# Patient Record
Sex: FEMALE | Race: WHITE | NOT HISPANIC OR LATINO
[De-identification: names, ages, dates, MRNs, and addresses within clinical notes are randomized per-mention and may not be internally consistent; named-entity substitution may affect disease eponyms.]

---

## 2021-03-15 PROBLEM — Z00.00 ENCOUNTER FOR PREVENTIVE HEALTH EXAMINATION: Status: ACTIVE | Noted: 2021-03-15

## 2021-03-24 ENCOUNTER — NON-APPOINTMENT (OUTPATIENT)
Age: 56
End: 2021-03-24

## 2021-03-24 ENCOUNTER — APPOINTMENT (OUTPATIENT)
Dept: CARDIOLOGY | Facility: CLINIC | Age: 56
End: 2021-03-24
Payer: COMMERCIAL

## 2021-03-24 VITALS
TEMPERATURE: 98.4 F | HEIGHT: 66 IN | DIASTOLIC BLOOD PRESSURE: 70 MMHG | BODY MASS INDEX: 22.5 KG/M2 | OXYGEN SATURATION: 97 % | SYSTOLIC BLOOD PRESSURE: 120 MMHG | HEART RATE: 60 BPM | WEIGHT: 140 LBS

## 2021-03-24 DIAGNOSIS — Z78.9 OTHER SPECIFIED HEALTH STATUS: ICD-10-CM

## 2021-03-24 DIAGNOSIS — Z80.7 FAMILY HISTORY OF OTHER MALIGNANT NEOPLASMS OF LYMPHOID, HEMATOPOIETIC AND RELATED TISSUES: ICD-10-CM

## 2021-03-24 DIAGNOSIS — Z13.6 ENCOUNTER FOR SCREENING FOR CARDIOVASCULAR DISORDERS: ICD-10-CM

## 2021-03-24 DIAGNOSIS — Z82.49 FAMILY HISTORY OF ISCHEMIC HEART DISEASE AND OTHER DISEASES OF THE CIRCULATORY SYSTEM: ICD-10-CM

## 2021-03-24 PROCEDURE — 99072 ADDL SUPL MATRL&STAF TM PHE: CPT

## 2021-03-24 PROCEDURE — 93000 ELECTROCARDIOGRAM COMPLETE: CPT

## 2021-03-24 PROCEDURE — 99204 OFFICE O/P NEW MOD 45 MIN: CPT

## 2021-03-24 NOTE — HISTORY OF PRESENT ILLNESS
[FreeTextEntry1] : Breonna is a 56yo female with PMH of HTN who presents to the office today for cardiac evaluation. Patient states she has never seen a cardiologist before and has family history of heart disease. Patient states she is feeling well today and has no issues or concerns, states she does not currently have any cardiac symptoms. Denies chest pain, palpitations, or dyspnea. Does endorse rare fatigue.

## 2021-03-24 NOTE — PHYSICAL EXAM
[General Appearance - Well Developed] : well developed [Normal Appearance] : normal appearance [Well Groomed] : well groomed [General Appearance - Well Nourished] : well nourished [No Deformities] : no deformities [General Appearance - In No Acute Distress] : no acute distress [Normal Conjunctiva] : the conjunctiva exhibited no abnormalities [Eyelids - No Xanthelasma] : the eyelids demonstrated no xanthelasmas [Normal Oral Mucosa] : normal oral mucosa [No Oral Pallor] : no oral pallor [No Oral Cyanosis] : no oral cyanosis [Normal Jugular Venous A Waves Present] : normal jugular venous A waves present [Normal Jugular Venous V Waves Present] : normal jugular venous V waves present [No Jugular Venous Fermin A Waves] : no jugular venous fermin A waves [Heart Rate And Rhythm] : heart rate and rhythm were normal [Heart Sounds] : normal S1 and S2 [Murmurs] : no murmurs present [Respiration, Rhythm And Depth] : normal respiratory rhythm and effort [Exaggerated Use Of Accessory Muscles For Inspiration] : no accessory muscle use [Auscultation Breath Sounds / Voice Sounds] : lungs were clear to auscultation bilaterally [Abdomen Soft] : soft [Abdomen Tenderness] : non-tender [Abdomen Mass (___ Cm)] : no abdominal mass palpated [Abnormal Walk] : normal gait [Gait - Sufficient For Exercise Testing] : the gait was sufficient for exercise testing [Nail Clubbing] : no clubbing of the fingernails [Cyanosis, Localized] : no localized cyanosis [Petechial Hemorrhages (___cm)] : no petechial hemorrhages [Skin Color & Pigmentation] : normal skin color and pigmentation [] : no rash [No Venous Stasis] : no venous stasis [Skin Lesions] : no skin lesions [No Skin Ulcers] : no skin ulcer [No Xanthoma] : no  xanthoma was observed [Oriented To Time, Place, And Person] : oriented to person, place, and time [Affect] : the affect was normal [Mood] : the mood was normal [No Anxiety] : not feeling anxious [FreeTextEntry1] : eliud

## 2021-03-29 ENCOUNTER — TRANSCRIPTION ENCOUNTER (OUTPATIENT)
Age: 56
End: 2021-03-29

## 2021-04-08 ENCOUNTER — APPOINTMENT (OUTPATIENT)
Dept: CARDIOLOGY | Facility: CLINIC | Age: 56
End: 2021-04-08
Payer: COMMERCIAL

## 2021-04-08 ENCOUNTER — NON-APPOINTMENT (OUTPATIENT)
Age: 56
End: 2021-04-08

## 2021-04-08 PROCEDURE — 93321 DOPPLER ECHO F-UP/LMTD STD: CPT

## 2021-04-08 PROCEDURE — 93351 STRESS TTE COMPLETE: CPT

## 2021-04-08 PROCEDURE — 93325 DOPPLER ECHO COLOR FLOW MAPG: CPT

## 2021-04-27 ENCOUNTER — NON-APPOINTMENT (OUTPATIENT)
Age: 56
End: 2021-04-27

## 2021-05-25 ENCOUNTER — APPOINTMENT (OUTPATIENT)
Dept: CARDIOLOGY | Facility: CLINIC | Age: 56
End: 2021-05-25

## 2021-06-04 ENCOUNTER — APPOINTMENT (OUTPATIENT)
Dept: PULMONOLOGY | Facility: CLINIC | Age: 56
End: 2021-06-04
Payer: COMMERCIAL

## 2021-06-04 ENCOUNTER — NON-APPOINTMENT (OUTPATIENT)
Age: 56
End: 2021-06-04

## 2021-06-04 VITALS
HEIGHT: 65 IN | TEMPERATURE: 97.5 F | SYSTOLIC BLOOD PRESSURE: 140 MMHG | OXYGEN SATURATION: 98 % | HEART RATE: 59 BPM | WEIGHT: 142 LBS | BODY MASS INDEX: 23.66 KG/M2 | DIASTOLIC BLOOD PRESSURE: 80 MMHG | RESPIRATION RATE: 16 BRPM

## 2021-06-04 DIAGNOSIS — Z86.19 PERSONAL HISTORY OF OTHER INFECTIOUS AND PARASITIC DISEASES: ICD-10-CM

## 2021-06-04 DIAGNOSIS — Z86.79 PERSONAL HISTORY OF OTHER DISEASES OF THE CIRCULATORY SYSTEM: ICD-10-CM

## 2021-06-04 DIAGNOSIS — Z82.49 FAMILY HISTORY OF ISCHEMIC HEART DISEASE AND OTHER DISEASES OF THE CIRCULATORY SYSTEM: ICD-10-CM

## 2021-06-04 DIAGNOSIS — Z86.39 PERSONAL HISTORY OF OTHER ENDOCRINE, NUTRITIONAL AND METABOLIC DISEASE: ICD-10-CM

## 2021-06-04 PROCEDURE — 94010 BREATHING CAPACITY TEST: CPT

## 2021-06-04 PROCEDURE — 71046 X-RAY EXAM CHEST 2 VIEWS: CPT

## 2021-06-04 PROCEDURE — 99204 OFFICE O/P NEW MOD 45 MIN: CPT | Mod: 25

## 2021-06-04 NOTE — ADDENDUM
[FreeTextEntry1] : Documented by Katiuska Powell acting as a scribe for Dr. Shiva Fuller on 06/04/2021 \par \par All medical record entries made by the Scribe were at my, Dr. Shiva Fuller's, direction and personally dictated by me on 06/04/2021 . I have reviewed the chart and agree that the record accurately reflects my personal performance of the history, physical exam, assessment and plan. I have also personally directed, reviewed, and agree with the discharge instructions.

## 2021-06-04 NOTE — HISTORY OF PRESENT ILLNESS
[TextBox_4] : Ms. CABALLERO is a 55 year old female presenting to the office today for initial pulmonary evaluation. Her chief complaint is\par - Denies any SOB \par - she notes she likes to walk for exercise \par - no muscle aches / pains\par - mother had mold exposure in the house\par - her sleep has been good \par - she notes she is not sure if she snores\par - when she wakes up in the morning she feels rested most of the time \par - memory / concentration could be better\par - she reports being able to fall asleep as a passenger in a car for over an hour\par - she reports being able to fall asleep while watching a boring TV show \par - she notes before her daughter was born she was told she had sarcoid, she was given steroids and was told it went away. She notes this was over 22 years ago in 1990s. \par -she denies any visual issues, headaches, nausea, vomiting, fever, chills, sweats, chest pain, chest pressure, diarrhea, constipation, dysphagia, dizziness, leg swelling, leg pain, itchy eyes, itchy ears, heartburn, reflux, sour taste in the mouth, myalgias or arthralgias.\par

## 2021-06-04 NOTE — PROCEDURE
[FreeTextEntry1] :  CXR reveals  normal sized heart; there was no evidence of infiltrate or effusion -- A normal chest radiograph. \par \par CT (APR.15.2021) IMPRESSION: 1. proximal aorta upper normal to slightly dilated at 3.8 cm. 2. several 2 to 4 mm solid and groundglass nodules right lung. recommended to follow up CT chest in 12 months as per Fleischner criteria. \par \par PFT reveals normal flows, with an FEV1 of   2.36L, which is 86% of predicted, with normal flow volume loop

## 2021-06-08 LAB — ERYTHROCYTE [SEDIMENTATION RATE] IN BLOOD BY WESTERGREN METHOD: 12 MM/HR

## 2021-06-10 ENCOUNTER — TRANSCRIPTION ENCOUNTER (OUTPATIENT)
Age: 56
End: 2021-06-10

## 2021-06-10 ENCOUNTER — NON-APPOINTMENT (OUTPATIENT)
Age: 56
End: 2021-06-10

## 2021-06-10 LAB
ACE BLD-CCNC: 69 U/L
M TB IFN-G BLD-IMP: NEGATIVE
QUANTIFERON TB PLUS MITOGEN MINUS NIL: 8.73 IU/ML
QUANTIFERON TB PLUS NIL: 0.03 IU/ML
QUANTIFERON TB PLUS TB1 MINUS NIL: -0.01 IU/ML
QUANTIFERON TB PLUS TB2 MINUS NIL: -0.01 IU/ML

## 2021-06-11 LAB
ALTERN TENCAPG(M6): 3.5 MCG/ML
ASPER FUMCAPG(M3): 33.8 MCG/ML
AUREOBASCAPG(M12): 3.9 MCG/ML
MICROPOLYCAPG(M22): 3.2 MCG/ML
PENIC CHRYCAPG(M1): 14.3 MCG/ML
PHOMA BETAE IGG: 4.9 MCG/ML
THERMOCAPG(M23): 10.9 MCG/ML
TRICHODERMA VIRIDE IGG: 2.3 MCG/ML

## 2021-06-15 ENCOUNTER — APPOINTMENT (OUTPATIENT)
Dept: CARDIOLOGY | Facility: CLINIC | Age: 56
End: 2021-06-15

## 2021-06-17 ENCOUNTER — TRANSCRIPTION ENCOUNTER (OUTPATIENT)
Age: 56
End: 2021-06-17

## 2021-06-17 ENCOUNTER — NON-APPOINTMENT (OUTPATIENT)
Age: 56
End: 2021-06-17

## 2021-07-13 ENCOUNTER — APPOINTMENT (OUTPATIENT)
Dept: CARDIOLOGY | Facility: CLINIC | Age: 56
End: 2021-07-13
Payer: COMMERCIAL

## 2021-07-13 VITALS
DIASTOLIC BLOOD PRESSURE: 81 MMHG | BODY MASS INDEX: 23.32 KG/M2 | WEIGHT: 140 LBS | TEMPERATURE: 98.3 F | HEIGHT: 65 IN | HEART RATE: 65 BPM | OXYGEN SATURATION: 96 % | SYSTOLIC BLOOD PRESSURE: 140 MMHG

## 2021-07-13 DIAGNOSIS — L81.2 FRECKLES: ICD-10-CM

## 2021-07-13 DIAGNOSIS — Z12.39 ENCOUNTER FOR OTHER SCREENING FOR MALIGNANT NEOPLASM OF BREAST: ICD-10-CM

## 2021-07-13 DIAGNOSIS — Z12.11 ENCOUNTER FOR SCREENING FOR MALIGNANT NEOPLASM OF COLON: ICD-10-CM

## 2021-07-13 PROCEDURE — 99213 OFFICE O/P EST LOW 20 MIN: CPT

## 2021-07-13 NOTE — HISTORY OF PRESENT ILLNESS
[FreeTextEntry1] : pt presents for f/u pt with hcx of dyslipidemia on statin mild cad by calcium score .pt feels well pt seeing dr lyon re lung nodules .pt with remote sarcoid hx pt denies any chest  pain dizziness ,lightheadedness ,nausea vomiting diaphoresis\par

## 2021-07-13 NOTE — PHYSICAL EXAM

## 2021-07-27 ENCOUNTER — NON-APPOINTMENT (OUTPATIENT)
Age: 56
End: 2021-07-27

## 2021-08-30 DIAGNOSIS — Z01.812 ENCOUNTER FOR PREPROCEDURAL LABORATORY EXAMINATION: ICD-10-CM

## 2021-11-17 ENCOUNTER — TRANSCRIPTION ENCOUNTER (OUTPATIENT)
Age: 56
End: 2021-11-17

## 2021-11-21 ENCOUNTER — TRANSCRIPTION ENCOUNTER (OUTPATIENT)
Age: 56
End: 2021-11-21

## 2021-12-02 ENCOUNTER — NON-APPOINTMENT (OUTPATIENT)
Age: 56
End: 2021-12-02

## 2021-12-13 ENCOUNTER — RX RENEWAL (OUTPATIENT)
Age: 56
End: 2021-12-13

## 2022-01-12 ENCOUNTER — APPOINTMENT (OUTPATIENT)
Dept: CARDIOLOGY | Facility: CLINIC | Age: 57
End: 2022-01-12

## 2022-01-14 ENCOUNTER — APPOINTMENT (OUTPATIENT)
Dept: CARDIOLOGY | Facility: CLINIC | Age: 57
End: 2022-01-14
Payer: COMMERCIAL

## 2022-01-14 ENCOUNTER — NON-APPOINTMENT (OUTPATIENT)
Age: 57
End: 2022-01-14

## 2022-01-14 VITALS
DIASTOLIC BLOOD PRESSURE: 80 MMHG | OXYGEN SATURATION: 98 % | WEIGHT: 155 LBS | SYSTOLIC BLOOD PRESSURE: 142 MMHG | HEIGHT: 65 IN | TEMPERATURE: 98.3 F | HEART RATE: 63 BPM | BODY MASS INDEX: 25.83 KG/M2

## 2022-01-14 DIAGNOSIS — R53.83 OTHER FATIGUE: ICD-10-CM

## 2022-01-14 DIAGNOSIS — Z71.85 ENCOUNTER FOR IMMUNIZATION SAFETY COUNSELING: ICD-10-CM

## 2022-01-14 PROCEDURE — 93000 ELECTROCARDIOGRAM COMPLETE: CPT

## 2022-01-14 PROCEDURE — 99213 OFFICE O/P EST LOW 20 MIN: CPT

## 2022-01-14 RX ORDER — ASPIRIN ENTERIC COATED TABLETS 81 MG 81 MG/1
81 TABLET, DELAYED RELEASE ORAL
Qty: 100 | Refills: 4 | Status: DISCONTINUED | COMMUNITY
Start: 2021-04-08 | End: 2022-01-14

## 2022-01-14 NOTE — HISTORY OF PRESENT ILLNESS
[FreeTextEntry1] : This is a 56 year old female with PMHx of HTN, HLD, Dilated Aortic root, CAD, Sarcoidosis, who presents today for follow up visit. She feels well overall today with no acute complaints or concerns at today's visit. Denies chest pain, dyspnea, dizziness, palpations, changes in appetite/bowel habits, nausea, vomiting, abdominal pain.pt with rare fatigue \par

## 2022-01-25 ENCOUNTER — TRANSCRIPTION ENCOUNTER (OUTPATIENT)
Age: 57
End: 2022-01-25

## 2022-02-10 LAB
25(OH)D3 SERPL-MCNC: 21.9 NG/ML
ALBUMIN SERPL ELPH-MCNC: 4.5 G/DL
ALBUMIN SERPL ELPH-MCNC: 4.6 G/DL
ALBUMIN SERPL ELPH-MCNC: 4.8 G/DL
ALP BLD-CCNC: 71 U/L
ALP BLD-CCNC: 78 U/L
ALP BLD-CCNC: 93 U/L
ALT SERPL-CCNC: 11 U/L
ALT SERPL-CCNC: 14 U/L
ALT SERPL-CCNC: 9 U/L
ANION GAP SERPL CALC-SCNC: 11 MMOL/L
ANION GAP SERPL CALC-SCNC: 11 MMOL/L
ANION GAP SERPL CALC-SCNC: 14 MMOL/L
ANION GAP SERPL CALC-SCNC: 16 MMOL/L
APPEARANCE: CLEAR
APPEARANCE: CLEAR
AST SERPL-CCNC: 18 U/L
AST SERPL-CCNC: 22 U/L
AST SERPL-CCNC: 24 U/L
BACTERIA UR CULT: NORMAL
BACTERIA: NEGATIVE
BACTERIA: NEGATIVE
BASOPHILS # BLD AUTO: 0.02 K/UL
BASOPHILS # BLD AUTO: 0.03 K/UL
BASOPHILS # BLD AUTO: 0.03 K/UL
BASOPHILS NFR BLD AUTO: 0.4 %
BASOPHILS NFR BLD AUTO: 0.5 %
BASOPHILS NFR BLD AUTO: 0.5 %
BILIRUB DIRECT SERPL-MCNC: 0.1 MG/DL
BILIRUB DIRECT SERPL-MCNC: 0.1 MG/DL
BILIRUB INDIRECT SERPL-MCNC: 0.2 MG/DL
BILIRUB INDIRECT SERPL-MCNC: 0.4 MG/DL
BILIRUB SERPL-MCNC: 0.2 MG/DL
BILIRUB SERPL-MCNC: 0.5 MG/DL
BILIRUB SERPL-MCNC: 0.5 MG/DL
BILIRUBIN URINE: NEGATIVE
BILIRUBIN URINE: NEGATIVE
BLOOD URINE: NEGATIVE
BLOOD URINE: NEGATIVE
BUN SERPL-MCNC: 11 MG/DL
BUN SERPL-MCNC: 12 MG/DL
BUN SERPL-MCNC: 16 MG/DL
BUN SERPL-MCNC: 17 MG/DL
CALCIUM SERPL-MCNC: 10 MG/DL
CALCIUM SERPL-MCNC: 10.1 MG/DL
CALCIUM SERPL-MCNC: 10.1 MG/DL
CALCIUM SERPL-MCNC: 9.6 MG/DL
CHLORIDE SERPL-SCNC: 101 MMOL/L
CHLORIDE SERPL-SCNC: 103 MMOL/L
CHLORIDE SERPL-SCNC: 103 MMOL/L
CHLORIDE SERPL-SCNC: 105 MMOL/L
CHOLEST SERPL-MCNC: 181 MG/DL
CHOLEST SERPL-MCNC: 210 MG/DL
CHOLEST SERPL-MCNC: 297 MG/DL
CK SERPL-CCNC: 60 U/L
CK SERPL-CCNC: 72 U/L
CO2 SERPL-SCNC: 23 MMOL/L
CO2 SERPL-SCNC: 23 MMOL/L
CO2 SERPL-SCNC: 26 MMOL/L
CO2 SERPL-SCNC: 26 MMOL/L
COLOR: COLORLESS
COLOR: NORMAL
CREAT SERPL-MCNC: 0.78 MG/DL
CREAT SERPL-MCNC: 0.8 MG/DL
CREAT SERPL-MCNC: 0.81 MG/DL
CREAT SERPL-MCNC: 0.89 MG/DL
EOSINOPHIL # BLD AUTO: 0.13 K/UL
EOSINOPHIL # BLD AUTO: 0.13 K/UL
EOSINOPHIL # BLD AUTO: 0.15 K/UL
EOSINOPHIL NFR BLD AUTO: 2.2 %
EOSINOPHIL NFR BLD AUTO: 2.4 %
EOSINOPHIL NFR BLD AUTO: 2.7 %
ESTIMATED AVERAGE GLUCOSE: 117 MG/DL
ESTIMATED AVERAGE GLUCOSE: 120 MG/DL
ESTIMATED AVERAGE GLUCOSE: 131 MG/DL
GLUCOSE QUALITATIVE U: NEGATIVE
GLUCOSE QUALITATIVE U: NEGATIVE
GLUCOSE SERPL-MCNC: 110 MG/DL
GLUCOSE SERPL-MCNC: 87 MG/DL
GLUCOSE SERPL-MCNC: 87 MG/DL
GLUCOSE SERPL-MCNC: 91 MG/DL
HBA1C MFR BLD HPLC: 5.7 %
HBA1C MFR BLD HPLC: 5.8 %
HBA1C MFR BLD HPLC: 6.2 %
HCT VFR BLD CALC: 43.6 %
HCT VFR BLD CALC: 44.1 %
HCT VFR BLD CALC: 44.4 %
HDLC SERPL-MCNC: 67 MG/DL
HDLC SERPL-MCNC: 74 MG/DL
HDLC SERPL-MCNC: 78 MG/DL
HGB BLD-MCNC: 13.9 G/DL
HGB BLD-MCNC: 14.3 G/DL
HGB BLD-MCNC: 14.4 G/DL
HYALINE CASTS: 0 /LPF
HYALINE CASTS: 0 /LPF
IMM GRANULOCYTES NFR BLD AUTO: 0.2 %
IMM GRANULOCYTES NFR BLD AUTO: 0.2 %
IMM GRANULOCYTES NFR BLD AUTO: 0.4 %
KETONES URINE: NEGATIVE
KETONES URINE: NEGATIVE
LDLC SERPL CALC-MCNC: 115 MG/DL
LDLC SERPL CALC-MCNC: 207 MG/DL
LDLC SERPL CALC-MCNC: 95 MG/DL
LDLC SERPL DIRECT ASSAY-MCNC: 121 MG/DL
LDLC SERPL DIRECT ASSAY-MCNC: 96 MG/DL
LEUKOCYTE ESTERASE URINE: NEGATIVE
LEUKOCYTE ESTERASE URINE: NEGATIVE
LYMPHOCYTES # BLD AUTO: 1.8 K/UL
LYMPHOCYTES # BLD AUTO: 1.95 K/UL
LYMPHOCYTES # BLD AUTO: 2.17 K/UL
LYMPHOCYTES NFR BLD AUTO: 31.9 %
LYMPHOCYTES NFR BLD AUTO: 32.9 %
LYMPHOCYTES NFR BLD AUTO: 40.5 %
MAN DIFF?: NORMAL
MCHC RBC-ENTMCNC: 29.1 PG
MCHC RBC-ENTMCNC: 29.3 PG
MCHC RBC-ENTMCNC: 30 PG
MCHC RBC-ENTMCNC: 31.5 GM/DL
MCHC RBC-ENTMCNC: 32.2 GM/DL
MCHC RBC-ENTMCNC: 33 GM/DL
MCV RBC AUTO: 90.8 FL
MCV RBC AUTO: 91 FL
MCV RBC AUTO: 92.5 FL
MICROSCOPIC-UA: NORMAL
MICROSCOPIC-UA: NORMAL
MONOCYTES # BLD AUTO: 0.36 K/UL
MONOCYTES # BLD AUTO: 0.36 K/UL
MONOCYTES # BLD AUTO: 0.4 K/UL
MONOCYTES NFR BLD AUTO: 6.1 %
MONOCYTES NFR BLD AUTO: 6.7 %
MONOCYTES NFR BLD AUTO: 7.1 %
NEUTROPHILS # BLD AUTO: 2.67 K/UL
NEUTROPHILS # BLD AUTO: 3.25 K/UL
NEUTROPHILS # BLD AUTO: 3.45 K/UL
NEUTROPHILS NFR BLD AUTO: 49.8 %
NEUTROPHILS NFR BLD AUTO: 57.4 %
NEUTROPHILS NFR BLD AUTO: 58.1 %
NITRITE URINE: NEGATIVE
NITRITE URINE: NEGATIVE
NONHDLC SERPL-MCNC: 107 MG/DL
NONHDLC SERPL-MCNC: 132 MG/DL
NONHDLC SERPL-MCNC: 229 MG/DL
PH URINE: 6
PH URINE: 6.5
PLATELET # BLD AUTO: 192 K/UL
PLATELET # BLD AUTO: 200 K/UL
PLATELET # BLD AUTO: 218 K/UL
POTASSIUM SERPL-SCNC: 3.9 MMOL/L
POTASSIUM SERPL-SCNC: 4.1 MMOL/L
POTASSIUM SERPL-SCNC: 4.3 MMOL/L
POTASSIUM SERPL-SCNC: 4.4 MMOL/L
PROT SERPL-MCNC: 6.7 G/DL
PROT SERPL-MCNC: 7.3 G/DL
PROT SERPL-MCNC: 7.4 G/DL
PROTEIN URINE: NEGATIVE
PROTEIN URINE: NEGATIVE
RBC # BLD: 4.77 M/UL
RBC # BLD: 4.8 M/UL
RBC # BLD: 4.88 M/UL
RBC # FLD: 13 %
RBC # FLD: 13.2 %
RBC # FLD: 13.5 %
RED BLOOD CELLS URINE: 0 /HPF
RED BLOOD CELLS URINE: 0 /HPF
SODIUM SERPL-SCNC: 139 MMOL/L
SODIUM SERPL-SCNC: 140 MMOL/L
SODIUM SERPL-SCNC: 141 MMOL/L
SODIUM SERPL-SCNC: 142 MMOL/L
SPECIFIC GRAVITY URINE: 1.01
SPECIFIC GRAVITY URINE: 1.01
SQUAMOUS EPITHELIAL CELLS: 0 /HPF
SQUAMOUS EPITHELIAL CELLS: 0 /HPF
T3FREE SERPL-MCNC: 3.39 PG/ML
T4 FREE SERPL-MCNC: 1.3 NG/DL
T4 FREE SERPL-MCNC: 1.4 NG/DL
TRIGL SERPL-MCNC: 113 MG/DL
TRIGL SERPL-MCNC: 63 MG/DL
TRIGL SERPL-MCNC: 85 MG/DL
TSH SERPL-ACNC: 1.36 UIU/ML
TSH SERPL-ACNC: 1.36 UIU/ML
UROBILINOGEN URINE: NORMAL
UROBILINOGEN URINE: NORMAL
WBC # FLD AUTO: 5.36 K/UL
WBC # FLD AUTO: 5.65 K/UL
WBC # FLD AUTO: 5.93 K/UL
WHITE BLOOD CELLS URINE: 0 /HPF
WHITE BLOOD CELLS URINE: 0 /HPF

## 2022-05-17 ENCOUNTER — APPOINTMENT (OUTPATIENT)
Dept: PULMONOLOGY | Facility: CLINIC | Age: 57
End: 2022-05-17
Payer: COMMERCIAL

## 2022-05-17 VITALS
DIASTOLIC BLOOD PRESSURE: 70 MMHG | BODY MASS INDEX: 25.66 KG/M2 | HEART RATE: 67 BPM | WEIGHT: 154 LBS | SYSTOLIC BLOOD PRESSURE: 123 MMHG | HEIGHT: 65 IN | RESPIRATION RATE: 16 BRPM | OXYGEN SATURATION: 96 % | TEMPERATURE: 97.8 F

## 2022-05-17 PROCEDURE — 94729 DIFFUSING CAPACITY: CPT

## 2022-05-17 PROCEDURE — 95012 NITRIC OXIDE EXP GAS DETER: CPT

## 2022-05-17 PROCEDURE — 99214 OFFICE O/P EST MOD 30 MIN: CPT | Mod: 25

## 2022-05-17 PROCEDURE — 94010 BREATHING CAPACITY TEST: CPT

## 2022-05-17 PROCEDURE — 94727 GAS DIL/WSHOT DETER LNG VOL: CPT

## 2022-05-17 NOTE — ADDENDUM
[FreeTextEntry1] : Documented by Steffany Murdock acting as a scribe for Dr. Shiva Fuller on 05/17/2022 \par \par All medical record entries made by the Scribe were at my, Dr. Shiva Fuller's, direction and personally dictated by me on 05/17/2022 . I have reviewed the chart and agree that the record accurately reflects my personal performance of the history, physical exam, assessment and plan. I have also personally directed, reviewed, and agree with the discharge instructions

## 2022-05-17 NOTE — PROCEDURE
[FreeTextEntry1] : Full PFT revealed normal flows, with a FEV1 of 2.30L,which is 87% of predicted,  normal lung volumes, and a normal diffusion of 20.1 , which is 95% of predicted with a normal flow volume loop\par \par FENO was 11 ; a normal value being less than 25\par Fractional exhaled nitric oxide (FENO) is regarded as a simple, noninvasive method for assessing eosinophilic airway inflammation. Produced by a variety of cells within the lung, nitric oxide (NO) concentrations are generally low in healthy individuals. However, high concentrations of NO appear to be involved in nonspecific host defense mechanisms and chronic inflammatory diseases such as asthma. The American Thoracic Society (ATS) therefore has recommended using FENO to aid in the diagnosis and monitoring of eosinophilic airway inflammation and asthma, and for identifying steroid responsive individuals whose chronic respiratory symptoms may be caused by airway inflammation. \par

## 2022-05-17 NOTE — ASSESSMENT
[FreeTextEntry1] : Ms. CABALLERO  is a 56 year old female with a history of CAD, HLD, HTN, chickenpox as a kid, ABNORMAL CT,  \par   remote sarcoidosis in 1990s treated w/ steroids, who now comes to the office for an initial pulmonary evaluation asymptomatic abnormal CT of chest ?scarring ?EDS, ?OSAS - mild allergy Sx\par \par Her shortness of breath is multifactorial due to:\par -poor mechanics of breathing \par -out of shape \par - over weight \par - pulmonary disease\par   - abnormal ct \par   - ?post sarcoidosis \par -cardiac disease \par \par problem 1: abnormal CT c/w inflammatory dz c/w changes \par ddx:  ?sarcoidosis, ?post chicken pox,  ?hypersensitivity pneumonitis , ?less likely CA, ?old TB , ?MINISTERIO , ?intraparenchymal lymph nodes \par - no indication that supports malignancy \par - complete  CT scan in sep/2021- [place]\par - get blood work: ESR, ACE level, hypersensitivity panel, Quantiferon Gold- WNL \par CAT scans are the only radiological modality to identify abnormalities w/in the lungs with regards to nodules/masses/lymph nodes. Risks, benefits were reviewed in detail. The guidelines for abnormalities include follow up CT scans at various intervals which could range from 6 weeks to 1 year intervals. If there is a change for the worse then consideration for a biopsy will be considered if you are a candidate. Second opinion evaluation with a thoracic surgeon or an interventional radiologist could be offered. \par \par Problem 2 Allergies. sinus \par -add Clarinex 5 mg QAM \par -add Xyzal 5 mg QHS \par olo\par -add Xyzal 5 mg QHS \par \par problem 4 Sarcoidosis - in remission \par - get full PFTs \par -Sarcoidosis is a medical mystery and can present with very serious illness or little consequence. The disease can affect any organ including skin, liver, lymph glands, spleen, eyes, nervous system, musculoskeletal system, heart, brain, kidneys, and including the lungs. Pulmonary sarcoidosis can cause loss of lung volume and abnormal lung stiffness. This disease is characterized by presence of granulomas, small areas of inflamed cells. Sarcoidosis patients should have regular cardiac and eye examinations as well as regular PFTs. \par \par \par Problem 5: ?OSAS (risk factors: snoring, fatigue)- 5/2022\par -recommended "Excite" \par - get HSS\par Sleep apnea is associated with adverse clinical consequences which can affect most organ systems.  Cardiovascular disease risk includes arrhythmias, atrial fibrillation, hypertension, coronary artery disease, and stroke. Metabolic disorders include diabetes type 2, non-alcoholic fatty liver disease. Mood disorder especially depression; and cognitive decline especially in the elderly. Associations with  chronic reflux/Drew’s esophagus some but not all inclusive. \par -Reasons  include arousal consistent with hypopnea; respiratory events most prominent in REM sleep or supine position; therefore sleep staging and body position are important for accurate diagnosis and estimation of AHI. \par \par \par problem 3: cardiac disease\par -recommended to continue to follow up with Cardiologist if needed. ([facility]anet) \par \par problem 4 : health maintenance \par -recommended Wim Hof and Buteyko breathing techniques \par -recommended internet exercise platforms: [employer] and Aerobic exercise for seniors\par -s/p Covid 19 vaccine x3 \par -recommended yearly flu shot after October 15\par -recommended strep pneumonia vaccines: Prevnar-13 vaccine, followed by Pneumo vaccine 23 one year following after 65 years old. \par -recommended early intervention for Upper Respiratory Infections (URIs)\par -recommended regular osteoporosis evaluations\par -recommended early dermatological evaluations\par -recommended after the age of 50 to the age of 70, colonoscopy every 5 years\par \par F/U in 6-8 weeks.\par She is encouraged to call with any changes, concerns, or questions\par \par

## 2022-05-17 NOTE — HISTORY OF PRESENT ILLNESS
[TextBox_4] : Ms. CABALLERO is a 56 year old female presenting to the office today for follow up pulmonary evaluation. Her chief complaint is\par \par -she notes generally feeling fine \par -she notes active allergies \par -she notes use of allergy Rx but symptoms still active \par -she denies palpitation s\par -she denies wheezing \par -she denies coughing \par -she notes good quality of sleep \par -she notes sleeping about 708 hrs on average \par -she notes sleep is mostly restful \par -she denies covid 19 infection \par -s/p Covid 19 vaccine x3 \par -she notes balance is stable \par -she notes snoring \par -she noets unable tot tolerate Zyrtec and currently using Xyzal \par \par -denies any visual issues, headaches, nausea, vomiting, fever, chills, sweats, chest pain, chest pressure, diarrhea, constipation, dysphagia, dizziness, leg swelling, leg pain, itchy eyes, itchy ears, heartburn, reflux, or sour taste in the mouth.

## 2022-05-30 ENCOUNTER — RX RENEWAL (OUTPATIENT)
Age: 57
End: 2022-05-30

## 2022-05-31 ENCOUNTER — APPOINTMENT (OUTPATIENT)
Dept: CT IMAGING | Facility: IMAGING CENTER | Age: 57
End: 2022-05-31
Payer: COMMERCIAL

## 2022-05-31 ENCOUNTER — OUTPATIENT (OUTPATIENT)
Dept: OUTPATIENT SERVICES | Facility: HOSPITAL | Age: 57
LOS: 1 days | End: 2022-05-31
Payer: COMMERCIAL

## 2022-05-31 DIAGNOSIS — R93.89 ABNORMAL FINDINGS ON DIAGNOSTIC IMAGING OF OTHER SPECIFIED BODY STRUCTURES: ICD-10-CM

## 2022-05-31 PROCEDURE — 71250 CT THORAX DX C-: CPT

## 2022-05-31 PROCEDURE — 71250 CT THORAX DX C-: CPT | Mod: 26

## 2022-06-03 ENCOUNTER — NON-APPOINTMENT (OUTPATIENT)
Age: 57
End: 2022-06-03

## 2022-06-27 ENCOUNTER — RX RENEWAL (OUTPATIENT)
Age: 57
End: 2022-06-27

## 2022-08-22 ENCOUNTER — RX RENEWAL (OUTPATIENT)
Age: 57
End: 2022-08-22

## 2022-11-06 ENCOUNTER — RX RENEWAL (OUTPATIENT)
Age: 57
End: 2022-11-06

## 2022-11-10 ENCOUNTER — RX RENEWAL (OUTPATIENT)
Age: 57
End: 2022-11-10

## 2022-11-12 ENCOUNTER — APPOINTMENT (OUTPATIENT)
Dept: CT IMAGING | Facility: CLINIC | Age: 57
End: 2022-11-12

## 2022-11-12 ENCOUNTER — OUTPATIENT (OUTPATIENT)
Dept: OUTPATIENT SERVICES | Facility: HOSPITAL | Age: 57
LOS: 1 days | End: 2022-11-12
Payer: COMMERCIAL

## 2022-11-12 DIAGNOSIS — R93.89 ABNORMAL FINDINGS ON DIAGNOSTIC IMAGING OF OTHER SPECIFIED BODY STRUCTURES: ICD-10-CM

## 2022-11-12 PROCEDURE — 71250 CT THORAX DX C-: CPT

## 2022-11-12 PROCEDURE — 71250 CT THORAX DX C-: CPT | Mod: 26

## 2022-11-17 ENCOUNTER — APPOINTMENT (OUTPATIENT)
Dept: PULMONOLOGY | Facility: CLINIC | Age: 57
End: 2022-11-17

## 2022-11-17 VITALS
WEIGHT: 150 LBS | TEMPERATURE: 97.8 F | HEART RATE: 70 BPM | BODY MASS INDEX: 24.99 KG/M2 | HEIGHT: 65 IN | RESPIRATION RATE: 16 BRPM | OXYGEN SATURATION: 96 % | DIASTOLIC BLOOD PRESSURE: 80 MMHG | SYSTOLIC BLOOD PRESSURE: 122 MMHG

## 2022-11-17 PROCEDURE — 99214 OFFICE O/P EST MOD 30 MIN: CPT | Mod: 25

## 2022-11-17 PROCEDURE — 94729 DIFFUSING CAPACITY: CPT

## 2022-11-17 PROCEDURE — 94010 BREATHING CAPACITY TEST: CPT

## 2022-11-17 PROCEDURE — 95012 NITRIC OXIDE EXP GAS DETER: CPT

## 2022-11-17 PROCEDURE — 94727 GAS DIL/WSHOT DETER LNG VOL: CPT

## 2022-11-17 RX ORDER — BENZONATATE 100 MG/1
100 CAPSULE ORAL
Qty: 30 | Refills: 0 | Status: DISCONTINUED | COMMUNITY
Start: 2022-10-19

## 2022-11-17 RX ORDER — AZITHROMYCIN 250 MG/1
250 TABLET, FILM COATED ORAL
Qty: 6 | Refills: 0 | Status: DISCONTINUED | COMMUNITY
Start: 2022-10-23

## 2022-11-17 NOTE — ASSESSMENT
[FreeTextEntry1] : Ms. CABALLERO  is a 57 year old female with a history of CAD, HLD, HTN, chickenpox as a kid, ABNORMAL CT,  \par   remote sarcoidosis in 1990s treated w/ steroids, who now comes to the office for a f/u pulmonary evaluation asymptomatic abnormal CT of chest ?scarring ?EDS, ?OSAS - mild allergy Sx - now ramification of PNA/bronchitis\par \par Her shortness of breath is multifactorial due to:\par -poor mechanics of breathing \par -out of shape \par - over weight \par - pulmonary disease\par   - abnormal ct \par   - ?post sarcoidosis \par -cardiac disease \par \par problem 1: abnormal CT c/w inflammatory dz c/w changes \par ddx:  ?sarcoidosis, ?post chicken pox,  ?hypersensitivity pneumonitis , ?less likely CA, ?old TB , ?MINISTERIO , ?intraparenchymal lymph nodes \par - no indication that supports malignancy \par - complete  CT scan in sep/2021- [place]\par - s/p blood work: ESR, ACE level, hypersensitivity panel, Quantiferon Gold- WNL \par CAT scans are the only radiological modality to identify abnormalities w/in the lungs with regards to nodules/masses/lymph nodes. Risks, benefits were reviewed in detail. The guidelines for abnormalities include follow up CT scans at various intervals which could range from 6 weeks to 1 year intervals. If there is a change for the worse then consideration for a biopsy will be considered if you are a candidate. Second opinion evaluation with a thoracic surgeon or an interventional radiologist could be offered. \par \par Problem 1A: PNA/RADS\par -add Doxycycline 100 mg BID \par -Add Symbicort 160 2 inhalations BID \par -f/u CT 4/2023\par \par Problem 2 Allergies. sinus \par -add Clarinex 5 mg QAM \par -add Xyzal 5 mg QHS \par \par problem 4 Sarcoidosis - in remission \par - get full PFTs \par -Sarcoidosis is a medical mystery and can present with very serious illness or little consequence. The disease can affect any organ including skin, liver, lymph glands, spleen, eyes, nervous system, musculoskeletal system, heart, brain, kidneys, and including the lungs. Pulmonary sarcoidosis can cause loss of lung volume and abnormal lung stiffness. This disease is characterized by presence of granulomas, small areas of inflamed cells. Sarcoidosis patients should have regular cardiac and eye examinations as well as regular PFTs. \par \par \par Problem 5: ?OSAS (risk factors: snoring, fatigue)- 5/2022 \par -recommended "Excite" \par - get HSS - pending\par Sleep apnea is associated with adverse clinical consequences which can affect most organ systems.  Cardiovascular disease risk includes arrhythmias, atrial fibrillation, hypertension, coronary artery disease, and stroke. Metabolic disorders include diabetes type 2, non-alcoholic fatty liver disease. Mood disorder especially depression; and cognitive decline especially in the elderly. Associations with  chronic reflux/Drew’s esophagus some but not all inclusive. \par -Reasons  include arousal consistent with hypopnea; respiratory events most prominent in REM sleep or supine position; therefore sleep staging and body position are important for accurate diagnosis and estimation of AHI. \par \par \par problem 3: cardiac disease\par -recommended to continue to follow up with Cardiologist if needed. (Yanet) \par \par problem 4 : health maintenance \par -recommended Wim Hof and Buteyko breathing techniques \par -recommended internet exercise platforms: [employer] [place] and Aerobic exercise for seniors\par -s/p Covid 19 vaccine x3 \par -recommended yearly flu shot after October 15 2022\par -recommended strep pneumonia vaccines: Prevnar-13 vaccine, followed by Pneumo vaccine 23 one year following after 65 years old. \par -recommended early intervention for Upper Respiratory Infections (URIs)\par -recommended regular osteoporosis evaluations\par -recommended early dermatological evaluations\par -recommended after the age of 50 to the age of 70, colonoscopy every 5 years\par \par F/U in 6-8 weeks.\par She is encouraged to call with any changes, concerns, or questions\par \par

## 2022-11-17 NOTE — HISTORY OF PRESENT ILLNESS
[TextBox_4] : Ms. CABALLERO is a 56 year old female presenting to the office today for follow up pulmonary evaluation. Her chief complaint is\par \par -she notes her energy levels are good\par -she notes a dry cough (went to urgent care twice)\par -she notes she was told her cough is allergy related\par -she notes her cough is improving \par -she notes her cough started one month ago\par -she denies PND \par -she notes exercising (walking 20,000 steps per day without issues)\par -she notes she is going to do a home sleep study\par -she notes her weight is stable \par -she notes her coughing is worse indoors\par -she notes she received a flu shot and had a fever afterwards\par \par -patient denies any headaches, nausea, vomiting, chills, sweats, chest pain, chest pressure, palpitations, wheezing, fatigue, diarrhea, constipation, dysphagia, myalgias, dizziness, leg swelling, leg pain, itchy eyes, itchy ears, heartburn, reflux or sour taste in the mouth

## 2022-11-17 NOTE — PROCEDURE
[FreeTextEntry1] : Full PFT revealed normal flows, with a FEV1 of 2.25L, which is 85% of predicted, normal lung volumes, and a diffusion of 19.6, which is 93% of predicted, with a normal flow volume loop \par \par CT Chest (11/12/2022) revealed few new patchy opacities in the right upper lobe and right lower lobe are nonspecific, but may be infectious/inflammatory in etiology. Recommend CT chest follow-up in 3 months to ensure clearing. No mediastinal or hilar lymphadenopathy. Coronary atherosclerosis.

## 2022-11-17 NOTE — ADDENDUM
[FreeTextEntry1] : Documented by Percy Shirley acting as a scribe for Dr. Shiva Fuller on 11/17/2022.\par \par All medical record entries made by the Scribe were at my, Dr. Shiva Fuller's, direction and personally dictated by me on 11/17/2022. I have reviewed the chart and agree that the record accurately reflects my personal performance of the history, physical exam, assessment and plan. I have also personally directed, reviewed, and agree with the discharge instructions.

## 2022-11-30 ENCOUNTER — NON-APPOINTMENT (OUTPATIENT)
Age: 57
End: 2022-11-30

## 2022-11-30 ENCOUNTER — APPOINTMENT (OUTPATIENT)
Dept: CARDIOLOGY | Facility: CLINIC | Age: 57
End: 2022-11-30

## 2022-11-30 VITALS
WEIGHT: 161 LBS | DIASTOLIC BLOOD PRESSURE: 70 MMHG | HEART RATE: 71 BPM | BODY MASS INDEX: 26.82 KG/M2 | OXYGEN SATURATION: 96 % | SYSTOLIC BLOOD PRESSURE: 120 MMHG | HEIGHT: 65 IN | TEMPERATURE: 97.7 F

## 2022-11-30 DIAGNOSIS — Z13.820 ENCOUNTER FOR SCREENING FOR OSTEOPOROSIS: ICD-10-CM

## 2022-11-30 DIAGNOSIS — Z12.83 ENCOUNTER FOR SCREENING FOR MALIGNANT NEOPLASM OF SKIN: ICD-10-CM

## 2022-11-30 DIAGNOSIS — R94.31 ABNORMAL ELECTROCARDIOGRAM [ECG] [EKG]: ICD-10-CM

## 2022-11-30 DIAGNOSIS — R89.9 UNSPECIFIED ABNORMAL FINDING IN SPECIMENS FROM OTHER ORGANS, SYSTEMS AND TISSUES: ICD-10-CM

## 2022-11-30 PROCEDURE — 99214 OFFICE O/P EST MOD 30 MIN: CPT | Mod: 25

## 2022-11-30 PROCEDURE — 93000 ELECTROCARDIOGRAM COMPLETE: CPT

## 2022-11-30 PROCEDURE — 93306 TTE W/DOPPLER COMPLETE: CPT

## 2022-11-30 NOTE — HISTORY OF PRESENT ILLNESS
[FreeTextEntry1] : This is a 56 y/o female with a pmhx of HTN, HLD, Dilated Aortic root, CAD, Sarcoidosis, who presents today for follow up visit. She reports she had a cough for a few weeks, went to urgent care who diagnosed it as allergies. Her cough was persisting and she went to see pulm, Dr. Fuller and had CT chest which showed pneumonia and patient was started on doxycycline and reports she feels better. She denies dyspnea. Patient denies chest pain,  , palpitations, dizziness, syncope, changes in bowel/bladder habits or appetite. \par pt with rare dyspnea

## 2022-12-12 ENCOUNTER — APPOINTMENT (OUTPATIENT)
Dept: SLEEP CENTER | Facility: CLINIC | Age: 57
End: 2022-12-12

## 2022-12-12 ENCOUNTER — RX RENEWAL (OUTPATIENT)
Age: 57
End: 2022-12-12

## 2022-12-12 ENCOUNTER — OUTPATIENT (OUTPATIENT)
Dept: OUTPATIENT SERVICES | Facility: HOSPITAL | Age: 57
LOS: 1 days | End: 2022-12-12
Payer: COMMERCIAL

## 2022-12-12 PROCEDURE — 95800 SLP STDY UNATTENDED: CPT

## 2022-12-12 PROCEDURE — 95800 SLP STDY UNATTENDED: CPT | Mod: 26

## 2022-12-16 ENCOUNTER — APPOINTMENT (OUTPATIENT)
Dept: PULMONOLOGY | Facility: CLINIC | Age: 57
End: 2022-12-16

## 2022-12-16 PROCEDURE — 99441: CPT

## 2023-01-20 ENCOUNTER — OUTPATIENT (OUTPATIENT)
Dept: OUTPATIENT SERVICES | Facility: HOSPITAL | Age: 58
LOS: 1 days | End: 2023-01-20
Payer: COMMERCIAL

## 2023-01-20 ENCOUNTER — APPOINTMENT (OUTPATIENT)
Dept: CARDIOLOGY | Facility: CLINIC | Age: 58
End: 2023-01-20

## 2023-01-20 DIAGNOSIS — I25.10 ATHEROSCLEROTIC HEART DISEASE OF NATIVE CORONARY ARTERY WITHOUT ANGINA PECTORIS: ICD-10-CM

## 2023-01-20 PROCEDURE — 75574 CT ANGIO HRT W/3D IMAGE: CPT

## 2023-01-20 PROCEDURE — 75574 CT ANGIO HRT W/3D IMAGE: CPT | Mod: 26

## 2023-01-24 DIAGNOSIS — G47.33 OBSTRUCTIVE SLEEP APNEA (ADULT) (PEDIATRIC): ICD-10-CM

## 2023-01-30 ENCOUNTER — NON-APPOINTMENT (OUTPATIENT)
Age: 58
End: 2023-01-30

## 2023-01-31 PROBLEM — R89.9 ABNORMAL LABORATORY TEST RESULT: Status: ACTIVE | Noted: 2023-01-31

## 2023-04-03 ENCOUNTER — APPOINTMENT (OUTPATIENT)
Dept: CT IMAGING | Facility: CLINIC | Age: 58
End: 2023-04-03
Payer: COMMERCIAL

## 2023-04-03 ENCOUNTER — OUTPATIENT (OUTPATIENT)
Dept: OUTPATIENT SERVICES | Facility: HOSPITAL | Age: 58
LOS: 1 days | End: 2023-04-03
Payer: COMMERCIAL

## 2023-04-03 DIAGNOSIS — Z00.8 ENCOUNTER FOR OTHER GENERAL EXAMINATION: ICD-10-CM

## 2023-04-03 DIAGNOSIS — R93.89 ABNORMAL FINDINGS ON DIAGNOSTIC IMAGING OF OTHER SPECIFIED BODY STRUCTURES: ICD-10-CM

## 2023-04-03 PROCEDURE — 71250 CT THORAX DX C-: CPT | Mod: 26

## 2023-04-12 ENCOUNTER — NON-APPOINTMENT (OUTPATIENT)
Age: 58
End: 2023-04-12

## 2023-04-28 ENCOUNTER — NON-APPOINTMENT (OUTPATIENT)
Age: 58
End: 2023-04-28

## 2023-04-28 ENCOUNTER — APPOINTMENT (OUTPATIENT)
Dept: PULMONOLOGY | Facility: CLINIC | Age: 58
End: 2023-04-28
Payer: COMMERCIAL

## 2023-04-28 VITALS
WEIGHT: 155 LBS | TEMPERATURE: 97.1 F | SYSTOLIC BLOOD PRESSURE: 120 MMHG | HEART RATE: 77 BPM | OXYGEN SATURATION: 96 % | BODY MASS INDEX: 25.83 KG/M2 | HEIGHT: 65 IN | DIASTOLIC BLOOD PRESSURE: 78 MMHG | RESPIRATION RATE: 16 BRPM

## 2023-04-28 PROCEDURE — 94729 DIFFUSING CAPACITY: CPT

## 2023-04-28 PROCEDURE — 94010 BREATHING CAPACITY TEST: CPT

## 2023-04-28 PROCEDURE — 99214 OFFICE O/P EST MOD 30 MIN: CPT | Mod: 25

## 2023-04-28 PROCEDURE — 94727 GAS DIL/WSHOT DETER LNG VOL: CPT

## 2023-04-28 RX ORDER — BUDESONIDE AND FORMOTEROL FUMARATE DIHYDRATE 160; 4.5 UG/1; UG/1
160-4.5 AEROSOL RESPIRATORY (INHALATION) TWICE DAILY
Qty: 1 | Refills: 2 | Status: ACTIVE | COMMUNITY
Start: 2023-04-28 | End: 1900-01-01

## 2023-04-28 RX ORDER — OLOPATADINE HYDROCHLORIDE 665 UG/1
0.6 SPRAY, METERED NASAL
Qty: 3 | Refills: 1 | Status: DISCONTINUED | COMMUNITY
Start: 2023-04-28 | End: 2023-04-28

## 2023-04-28 NOTE — ADDENDUM
[FreeTextEntry1] : Documented by Yair Jack acting as a scribe for Dr. Shiva Fuller on (04/28/2023).\par \par All medical record entries made by the Scribe were at my, Dr. Shiva Fuller's, direction and personally dictated by me on (04/28/2023). I have reviewed the chart and agree that the record accurately reflects my personal performance of the history, physical exam, assessment and plan. I have personally directed, reviewed, and agree with the discharge instructions.

## 2023-04-28 NOTE — HISTORY OF PRESENT ILLNESS
[TextBox_4] : Ms. CABALLERO is a 57 year old female presenting to the office today for follow up pulmonary evaluation. Her chief complaint is\par - she notes her sx started and she thought it might be due to allergies as she had ocular sx \par - she notes then she started dry coughing\par - she notes chest pressure yesterday (4/27)\par - she notes poor energy level \par - she notes being exhausted \par - she notes some hoarseness \par - she denies any visual issues \par - she notes weight is stable \par - she notes sleeping well \par - she notes getting 7-8 hours of sleep \par \par - She  denies any headaches, nausea, vomiting, fever, sweats, chest pains, chest pressure, diarrhea, constipation, dysphagia, myalgia, dizziness, leg swelling, leg pain, itchy eyes, itchy ears, heartburn, reflux, or sour taste in the mouth.

## 2023-04-28 NOTE — PROCEDURE
Progress Note    Patient: Yaima Shields Date: 8/9/2020   female, 58 year old  Admit Date: 8/8/2020   Attending: Randy Marin MD      Subjective:  Yaima Shields is a 58 year old female who is being seen in follow up for Partial small bowel obstruction (CMS/HCC)     - pt went for ex lap overnight due to high grade sbo and worsening clinical status, doing well this morning, afebrile, pain controlled on PCA, NG tube in place, not passing gas, not much output from NG tube, pt wanting to drink water, no dizziness, lightheadedness, n/v           Medications: personally reviewed today in this patient's active orders section of epic  Allergies:   Allergies as of 08/08/2020 - Reviewed 08/08/2020   Allergen Reaction Noted   • Ace inhibitors ANAPHYLAXIS 05/14/2013   • Cat dander PRURITUS and Other (See Comments) 05/29/2012   • Oxcarbazepine Other (See Comments) 05/29/2012       PHYSICAL EXAM:  Visit Vitals  BP (!) 155/72 (BP Location: LUE - Left upper extremity, Patient Position: Semi-Kat's)   Pulse (!) 52   Temp 97.7 °F (36.5 °C) (Oral)   Resp 14   Ht 5' 2\" (1.575 m)   Wt 107.8 kg   SpO2 97%   BMI 43.47 kg/m²     General: A & O X 3 in no acute distress, normal cephalic/atraumatic, Mood and Affect appropriate  Lungs: Clear to auscultation bilaterally  Heart:  Regular rate and rhythm and S1, S2 present  Abdomen: Soft, appropriate tenderness to palpation over surgical dressings/ND;  + B.S.; No guarding or rebound; No peritoneal signs  Extremities: cyanosis absent; no obvious lesions or wounds, no LE edema b/l  Neurologic:  Grossly intact as best as patient can cooperate with exam      Labs:  Recent Labs   Lab 08/09/20 0618 08/08/20 0319   WBC 12.8* 14.1*   RBC 3.74* 3.91*   HGB 11.4* 12.0   HCT 35.6* 36.0    276   SEG 88 89     Recent Labs   Lab 08/09/20 0618 08/08/20 0319   SODIUM 144 140   POTASSIUM 3.5 3.8   CHLORIDE 118* 110*   CO2 23 24   BUN 19 26*   CREATININE 0.68 0.82   GLUCOSE 130* 151*    CALCIUM 8.9 9.9   ALBUMIN 3.3* 4.0   AST 25 19   GPT 18 21   BILIRUBIN 0.4 0.2   ALKPT 56 65       Assessment & Plan:     · High grade partial small bowel obstruction from adhesions in the setting of of hx ventral hernia repair and bariatric surgery s/p ex lp with lysis of adhesions by Dr. Thibodeaux on 8/8/2020   -   - CT a/p showing: Moderate stool filled distention of proximal to mid jejunal loops, with underlying fecalization pattern, findings could be indicative of partial  small bowel obstruction  - pt failed gastrograffin challenge showing High-grade small bowel obstruction with multiple dilated small bowel loops containing contrast. No significant contrast in the colon  - NPO, advancement of diet as per general surgery   - NG tube: continue, management per general surgery  - general surgery following, Dr. Thibodeaux, appreciate assistance  - continue current pain medications, dilaudid PCA  - continue hydration    · Obesity, class 3- BMI on admission 40, most likely due to excess calories, lifestyle modifications, weight loss recommended     · Chronic kidney disease stage 2- Cr at near baseline on admission; renally dose meds, avoid nephrotoxic medications     · Anxiety and depression, bipolar, PTSD- on topamax, atarax, lithium, stelazine, ritalin, prazosin at home, continue     · Irritable bowel syndrome - continue current medications     · HTN essential, HLD - on prazosin, continue     ·  osteoporosis - on vitamin D supplementation, continue monitor     · Chronic insomnia - on sonata     · Lactic acidosis - NOT due to sepsis, type B lactic acidosis, trended down with hydration, continue monitor     · Leukocytosis - likely reactive, no e/o infection at this time, monitor with hydration and supportive care    · Hypokalemia - replete per protocol    · Anemia, normocytic, acute - likely dilutional as well as reactive, hgb stable, no e/o bleeding, monitor, full f/u and w/u as an outpatient recommended as per  PCP     · DVT Prophylaxis-SCDs, TEDs and sq lovenox        Central line: none  Hancock cath: yes, discontinue as no further indication  NG tube in place     Fluids: d5/0.45NS with K supplementation  Electrolytes: replete as needed  Nutrition: NPO     Guardian/POA: none  Living situation: from home     Code status: Full Resuscitation    Dispo:  Medical inpatient    -----------------------------------------------------------------------------------------------------------    Hospital Day #: Hospital Day: 2    Tentative discharge Disposition: To be determined         Randy Marin MD  Hospitalist  8/9/2020  10:04 AM             [FreeTextEntry1] : PFT's were performed for the evaluation of SOB/Sarcoidosis \par \par PFT reveals normal flows, with an FEV1 of  2.3L, which is 86% of predicted, with a normal flow volume loop \par \par Full PFT- spi reveals normal flows; FEV1 was 2.21L which is 87% of predicted; normal lung volumes; normal diffusion at 18.9, which is 89% of predicted; normal flow volume loop \par \par CT (04/03/2023) revealed No consolidation effusion or pneumothorax. If there are any prior, outside CT exams of the chest, they should be submitted for comparison. An addendum can be issued accordingly.\par

## 2023-04-28 NOTE — ASSESSMENT
[FreeTextEntry1] : Ms. CABALLERO  is a 57 year old female with a history of CAD, HLD, HTN, chickenpox as a kid, ABNORMAL CT,  \par   remote sarcoidosis in 1990s treated w/ steroids, who now comes to the office for a f/u pulmonary evaluation asymptomatic abnormal CT of chest ?scarring ?EDS, +OSAS - mild allergy Sx - now ramification of PNA/bronchitis\par \par Her shortness of breath is multifactorial due to:\par -poor mechanics of breathing \par -out of shape \par - over weight \par - pulmonary disease\par   - abnormal ct \par   - ?post sarcoidosis \par -cardiac disease \par \par problem 1: abnormal CT c/w inflammatory dz c/w changes \par ddx:  ?sarcoidosis, ?post chicken pox,  ?hypersensitivity pneumonitis , ?less likely CA, ?old TB , ?MINISTERIO , ?intraparenchymal lymph nodes \par - no indication that supports malignancy \par - complete  CT scan in sep/2021- [place], CT next 4/2024\par - s/p blood work: ESR, ACE level, hypersensitivity panel, Quantiferon Gold- WNL \par CAT scans are the only radiological modality to identify abnormalities w/in the lungs with regards to nodules/masses/lymph nodes. Risks, benefits were reviewed in detail. The guidelines for abnormalities include follow up CT scans at various intervals which could range from 6 weeks to 1 year intervals. If there is a change for the worse then consideration for a biopsy will be considered if you are a candidate. Second opinion evaluation with a thoracic surgeon or an interventional radiologist could be offered. \par \par Problem 1A: PNA/RADS\par -Add Symbicort 160 2 inhalations BID \par -f/u CT 4/2023 improved/ next 4/2024\par \par Problem 2 Allergies. sinus \par - Add Olopatadine 0.6% 1 sniff each nostril twice a day/ 0.2 ocular BID\par -add Clarinex 5 mg QAM \par -add Xyzal 5 mg QHS \par - Environmental measures for allergies were encouraged including mattress and pillow cover, air purifier, and environmental controls. \par \par problem 4 Sarcoidosis - in remission \par - full PFTs twice yearly \par -Sarcoidosis is a medical mystery and can present with very serious illness or little consequence. The disease can affect any organ including skin, liver, lymph glands, spleen, eyes, nervous system, musculoskeletal system, heart, brain, kidneys, and including the lungs. Pulmonary sarcoidosis can cause loss of lung volume and abnormal lung stiffness. This disease is characterized by presence of granulomas, small areas of inflamed cells. Sarcoidosis patients should have regular cardiac and eye examinations as well as regular PFTs. \par \par Problem 5: + OSAS- Mild (risk factors: snoring, fatigue)- 5/2022 \par -recommended "Excite" \par - s/p HSS - DD pending\par Sleep apnea is associated with adverse clinical consequences which can affect most organ systems.  Cardiovascular disease risk includes arrhythmias, atrial fibrillation, hypertension, coronary artery disease, and stroke. Metabolic disorders include diabetes type 2, non-alcoholic fatty liver disease. Mood disorder especially depression; and cognitive decline especially in the elderly. Associations with  chronic reflux/Drew’s esophagus some but not all inclusive. \par -Reasons  include arousal consistent with hypopnea; respiratory events most prominent in REM sleep or supine position; therefore sleep staging and body position are important for accurate diagnosis and estimation of AHI. \par \par \par problem 3: cardiac disease\par -recommended to continue to follow up with Cardiologist if needed. (Keshia) \par \par problem 4 : health maintenance \par -recommended Wim Hof and Buteyko breathing techniques \par -recommended internet exercise platforms: [employer] and Aerobic exercise for seniors\par -s/p Covid 19 vaccine x3 \par -recommended yearly flu shot after October 15 2022\par -recommended strep pneumonia vaccines: Prevnar-13 vaccine, followed by Pneumo vaccine 23 one year following after 65 years old. \par -recommended early intervention for Upper Respiratory Infections (URIs)\par -recommended regular osteoporosis evaluations\par -recommended early dermatological evaluations\par -recommended after the age of 50 to the age of 70, colonoscopy every 5 years\par \par F/U in 6-8 weeks.\par She is encouraged to call with any changes, concerns, or questions\par \par

## 2023-04-28 NOTE — PHYSICAL EXAM
[No Acute Distress] : no acute distress [Normal Oropharynx] : normal oropharynx [III] : Mallampati Class: III [Normal Appearance] : normal appearance [No Neck Mass] : no neck mass [Normal Rate/Rhythm] : normal rate/rhythm [Normal S1, S2] : normal s1, s2 [No Murmurs] : no murmurs [No Resp Distress] : no resp distress [Clear to Auscultation Bilaterally] : clear to auscultation bilaterally [No Abnormalities] : no abnormalities [Benign] : benign [Normal Gait] : normal gait [No Clubbing] : no clubbing [No Cyanosis] : no cyanosis [No Edema] : no edema [FROM] : FROM [Normal Color/ Pigmentation] : normal color/ pigmentation [No Focal Deficits] : no focal deficits [Oriented x3] : oriented x3 [Normal Affect] : normal affect [TextBox_68] : I:E 1:3, mild expiratory wheeze

## 2023-05-03 ENCOUNTER — APPOINTMENT (OUTPATIENT)
Dept: RADIOLOGY | Facility: CLINIC | Age: 58
End: 2023-05-03

## 2023-05-03 PROCEDURE — 71046 X-RAY EXAM CHEST 2 VIEWS: CPT

## 2023-05-03 PROCEDURE — 71250 CT THORAX DX C-: CPT

## 2023-05-03 PROCEDURE — 71046 X-RAY EXAM CHEST 2 VIEWS: CPT | Mod: 26

## 2023-05-03 RX ORDER — BUDESONIDE 0.5 MG/2ML
0.5 INHALANT ORAL TWICE DAILY
Qty: 2 | Refills: 1 | Status: ACTIVE | COMMUNITY
Start: 2023-05-03 | End: 1900-01-01

## 2023-05-03 RX ORDER — BLOOD-GLUCOSE METER
KIT MISCELLANEOUS
Qty: 1 | Refills: 0 | Status: ACTIVE | COMMUNITY
Start: 2023-05-03 | End: 1900-01-01

## 2023-05-03 RX ORDER — ALBUTEROL SULFATE 2.5 MG/3ML
(2.5 MG/3ML) SOLUTION RESPIRATORY (INHALATION) EVERY 6 HOURS
Qty: 120 | Refills: 1 | Status: ACTIVE | COMMUNITY
Start: 2023-05-03 | End: 1900-01-01

## 2023-05-04 ENCOUNTER — APPOINTMENT (OUTPATIENT)
Dept: PULMONOLOGY | Facility: CLINIC | Age: 58
End: 2023-05-04

## 2023-05-04 NOTE — HISTORY OF PRESENT ILLNESS
[TextBox_4] : Ms. CABALLERO is a 57 year old female presenting to the office today for follow up pulmonary evaluation. Her chief complaint is\par \par \par -patient denies any headaches, nausea, vomiting, fever, chills, sweats, chest pain, chest pressure, palpitations, coughing, wheezing, fatigue, diarrhea, constipation, dysphagia, myalgias, dizziness, leg swelling, leg pain, itchy eyes, itchy ears, heartburn, reflux or sour taste in the mouth

## 2023-05-04 NOTE — ASSESSMENT
[FreeTextEntry1] : Ms. CABALLERO  is a 57 year old female with a history of CAD, HLD, HTN, chickenpox as a kid, ABNORMAL CT,  \par   remote sarcoidosis in 1990s treated w/ steroids, who now comes to the office for a f/u pulmonary evaluation asymptomatic abnormal CT of chest ?scarring ?EDS, +OSAS - mild allergy Sx - now ramification of PNA/bronchitis\par \par Her shortness of breath is multifactorial due to:\par -poor mechanics of breathing \par -out of shape \par - over weight \par - pulmonary disease\par   - abnormal ct \par   - ?post sarcoidosis \par -cardiac disease \par \par problem 1: abnormal CT c/w inflammatory dz c/w changes \par ddx:  ?sarcoidosis, ?post chicken pox,  ?hypersensitivity pneumonitis , ?less likely CA, ?old TB , ?MINISTERIO , ?intraparenchymal lymph nodes \par - no indication that supports malignancy \par - complete  CT scan in sep/2021- [place], [place] next 4/2024\par - s/p blood work: ESR, ACE level, hypersensitivity panel, Quantiferon Gold- WNL \par CAT scans are the only radiological modality to identify abnormalities w/in the lungs with regards to nodules/masses/lymph nodes. Risks, benefits were reviewed in detail. The guidelines for abnormalities include follow up CT scans at various intervals which could range from 6 weeks to 1 year intervals. If there is a change for the worse then consideration for a biopsy will be considered if you are a candidate. Second opinion evaluation with a thoracic surgeon or an interventional radiologist could be offered. \par \par Problem 1A: PNA/RADS\par -Add Symbicort 160 2 inhalations BID \par -f/u CT 4/2023 improved/ next 4/2024\par \par Problem 2 Allergies. sinus \par - Add Olopatadine 0.6% 1 sniff each nostril twice a day/ 0.2 ocular BID\par -add Clarinex 5 mg QAM \par -add Xyzal 5 mg QHS \par - Environmental measures for allergies were encouraged including mattress and pillow cover, air purifier, and environmental controls. \par \par problem 4 Sarcoidosis - in remission \par - full PFTs twice yearly \par -Sarcoidosis is a medical mystery and can present with very serious illness or little consequence. The disease can affect any organ including skin, liver, lymph glands, spleen, eyes, nervous system, musculoskeletal system, heart, brain, kidneys, and including the lungs. Pulmonary sarcoidosis can cause loss of lung volume and abnormal lung stiffness. This disease is characterized by presence of granulomas, small areas of inflamed cells. Sarcoidosis patients should have regular cardiac and eye examinations as well as regular PFTs. \par \par Problem 5: + OSAS- Mild (risk factors: snoring, fatigue)- 5/2022 \par -recommended "Excite" \par - s/p HSS - DD pending\par Sleep apnea is associated with adverse clinical consequences which can affect most organ systems.  Cardiovascular disease risk includes arrhythmias, atrial fibrillation, hypertension, coronary artery disease, and stroke. Metabolic disorders include diabetes type 2, non-alcoholic fatty liver disease. Mood disorder especially depression; and cognitive decline especially in the elderly. Associations with  chronic reflux/Drew’s esophagus some but not all inclusive. \par -Reasons  include arousal consistent with hypopnea; respiratory events most prominent in REM sleep or supine position; therefore sleep staging and body position are important for accurate diagnosis and estimation of AHI. \par \par \par problem 3: cardiac disease\par -recommended to continue to follow up with Cardiologist if needed. (Keshia) \par \par problem 4 : health maintenance \par -recommended Wim Hof and Buteyko breathing techniques \par -recommended internet exercise platforms: [employer] and Aerobic exercise for seniors\par -s/p Covid 19 vaccine x3 \par -recommended yearly flu shot after October 15 2022\par -recommended strep pneumonia vaccines: Prevnar-13 vaccine, followed by Pneumo vaccine 23 one year following after 65 years old. \par -recommended early intervention for Upper Respiratory Infections (URIs)\par -recommended regular osteoporosis evaluations\par -recommended early dermatological evaluations\par -recommended after the age of 50 to the age of 70, colonoscopy every 5 years\par \par F/U in 6-8 weeks.\par She is encouraged to call with any changes, concerns, or questions\par \par

## 2023-05-05 ENCOUNTER — APPOINTMENT (OUTPATIENT)
Dept: PULMONOLOGY | Facility: CLINIC | Age: 58
End: 2023-05-05
Payer: COMMERCIAL

## 2023-05-05 DIAGNOSIS — U07.1 COVID-19: ICD-10-CM

## 2023-05-05 DIAGNOSIS — J45.909 UNSPECIFIED ASTHMA, UNCOMPLICATED: ICD-10-CM

## 2023-05-05 PROCEDURE — 99214 OFFICE O/P EST MOD 30 MIN: CPT | Mod: 95

## 2023-05-05 NOTE — HISTORY OF PRESENT ILLNESS
[Home] : at home, [unfilled] , at the time of the visit. [Medical Office: ([facility])___] : at the medical office located in  [Verbal consent obtained from patient] : the patient, [unfilled] [TextBox_4] : Ms. CABALLERO is a 57 year old female presenting to the office today via video call for follow up pulmonary evaluation. Her chief complaint is\par \par -she notes cough that worsens at night\par -she notes severe SAUCEDO that is progressing\par -she notes unproductive cough\par -she notes sore throat\par -she notes chills 2 nights ago\par -she notes poor appetite\par -she notes intermittent itchy ears\par \par -patient denies any headaches, nausea, vomiting, fever, sweats, chest pain, chest pressure, palpitations, wheezing, fatigue, diarrhea, constipation, dysphagia, myalgias, dizziness, leg swelling, leg pain, itchy eyes, heartburn, reflux or sour taste in the mouth

## 2023-05-05 NOTE — ASSESSMENT
[FreeTextEntry1] : Ms. CABALLERO  is a 57 year old female with a history of CAD, HLD, HTN, chickenpox as a kid, ABNORMAL CT,  \par remote sarcoidosis in 1990s treated w/ steroids, who now comes to the office via video call for a f/u pulmonary evaluation asymptomatic abnormal CT of chest ?scarring ?EDS, +OSAS - mild allergy Sx - now ramification of PNA/bronchitis; ?BOOP/\par \par Her shortness of breath is multifactorial due to:\par -poor mechanics of breathing \par -out of shape \par - over weight \par - pulmonary disease\par   - abnormal ct \par   - ?post sarcoidosis \par   -?/BOOP\par -cardiac disease \par \par problem 1: abnormal CT c/w inflammatory dz c/w changes \par ddx:  ?sarcoidosis, ?post chicken pox,  ?hypersensitivity pneumonitis , ?less likely CA, ?old TB , ?MINISTERIO , ?intraparenchymal lymph nodes \par - no indication that supports malignancy \par - complete  CT scan in sep/2021- [place], [place] next 4/2024\par - s/p blood work: ESR, ACE level, hypersensitivity panel, Quantiferon Gold- WNL \par CAT scans are the only radiological modality to identify abnormalities w/in the lungs with regards to nodules/masses/lymph nodes. Risks, benefits were reviewed in detail. The guidelines for abnormalities include follow up CT scans at various intervals which could range from 6 weeks to 1 year intervals. If there is a change for the worse then consideration for a biopsy will be considered if you are a candidate. Second opinion evaluation with a thoracic surgeon or an interventional radiologist could be offered. \par \par Problem 1A: PNA/RADS\par -Add Symbicort 160 2 inhalations BID \par -add Albuterol (.83) via nebulizer, up to Q6H\par -add Pulmicort 0.5% via nebulizer BID\par -add Biaxin 500 mg BID\par -f/u CT 4/2023 improved/ next 4/2024\par -Chest X-Ray/CT revealed an infiltrate c/w pneumonia; this based on the patient's clinical scenario required additional therapy. Any change in patient's status will require hospitalization at the nearest hospital and a local ambulance will need to be called. Our group is on staff at [facility] and [facility] as consultants. The patient/family is instructed to notify our office with any change in condition.\par -Asthma is believed to be caused by inherited (genetic) and environmental factor, but its exact cause is unknown. Asthma may be triggered by allergens, lung infections, or irritants in the air. Asthma triggers are different for each person.\par \par \par Problem 2 Allergies. sinus \par - Add Olopatadine 0.6% 1 sniff each nostril twice a day/ 0.2 ocular BID\par -add Clarinex 5 mg QAM \par -add Xyzal 5 mg QHS \par - Environmental measures for allergies were encouraged including mattress and pillow cover, air purifier, and environmental controls. \par \par problem 4 Sarcoidosis - in remission \par - full PFTs twice yearly \par -Sarcoidosis is a medical mystery and can present with very serious illness or little consequence. The disease can affect any organ including skin, liver, lymph glands, spleen, eyes, nervous system, musculoskeletal system, heart, brain, kidneys, and including the lungs. Pulmonary sarcoidosis can cause loss of lung volume and abnormal lung stiffness. This disease is characterized by presence of granulomas, small areas of inflamed cells. Sarcoidosis patients should have regular cardiac and eye examinations as well as regular PFTs. \par \par Problem 5: + OSAS- Mild (risk factors: snoring, fatigue)- 5/2022 \par -recommended "Excite" \par - s/p HSS - DD pending\par Sleep apnea is associated with adverse clinical consequences which can affect most organ systems.  Cardiovascular disease risk includes arrhythmias, atrial fibrillation, hypertension, coronary artery disease, and stroke. Metabolic disorders include diabetes type 2, non-alcoholic fatty liver disease. Mood disorder especially depression; and cognitive decline especially in the elderly. Associations with  chronic reflux/Drew’s esophagus some but not all inclusive. \par -Reasons  include arousal consistent with hypopnea; respiratory events most prominent in REM sleep or supine position; therefore sleep staging and body position are important for accurate diagnosis and estimation of AHI. \par \par \par problem 3: cardiac disease\par -recommended to continue to follow up with Cardiologist if needed. (Keshia) \par \par problem 4 : health maintenance \par -recommended Wim Hof and Buteyko breathing techniques \par -recommended internet exercise platforms: [employer] and Aerobic exercise for seniors\par -s/p Covid 19 vaccine x3 \par -recommended yearly flu shot after October 15 2022\par -recommended strep pneumonia vaccines: Prevnar-13 vaccine, followed by Pneumo vaccine 23 one year following after 65 years old. \par -recommended early intervention for Upper Respiratory Infections (URIs)\par -recommended regular osteoporosis evaluations\par -recommended early dermatological evaluations\par -recommended after the age of 50 to the age of 70, colonoscopy every 5 years\par \par F/U in 3-4 months\par She is encouraged to call with any changes, concerns, or questions\par \par

## 2023-05-05 NOTE — ADDENDUM
[FreeTextEntry1] : Documented by BASIL Srinivasan acting as a scribe for Dr. Shiva Fuller on 05/05/2023 .\par \par All medical record entries made by the Scribe were at my, Dr. Shiva Fuller's, direction and personally dictated by me on 05/05/2023. I have reviewed the chart and agree that the record accurately reflects my personal performance of the history, physical exam, assessment and plan. I have also personally directed, reviewed, and agree with the discharge instructions.

## 2023-05-09 ENCOUNTER — NON-APPOINTMENT (OUTPATIENT)
Age: 58
End: 2023-05-09

## 2023-05-10 ENCOUNTER — APPOINTMENT (OUTPATIENT)
Dept: CT IMAGING | Facility: CLINIC | Age: 58
End: 2023-05-10

## 2023-05-17 ENCOUNTER — APPOINTMENT (OUTPATIENT)
Dept: PULMONOLOGY | Facility: CLINIC | Age: 58
End: 2023-05-17

## 2023-05-31 ENCOUNTER — APPOINTMENT (OUTPATIENT)
Dept: PULMONOLOGY | Facility: CLINIC | Age: 58
End: 2023-05-31
Payer: COMMERCIAL

## 2023-05-31 ENCOUNTER — APPOINTMENT (OUTPATIENT)
Dept: CARDIOLOGY | Facility: CLINIC | Age: 58
End: 2023-05-31
Payer: COMMERCIAL

## 2023-05-31 ENCOUNTER — NON-APPOINTMENT (OUTPATIENT)
Age: 58
End: 2023-05-31

## 2023-05-31 VITALS
SYSTOLIC BLOOD PRESSURE: 140 MMHG | WEIGHT: 156 LBS | HEART RATE: 60 BPM | OXYGEN SATURATION: 98 % | TEMPERATURE: 97.8 F | DIASTOLIC BLOOD PRESSURE: 82 MMHG | HEIGHT: 65 IN | BODY MASS INDEX: 25.99 KG/M2

## 2023-05-31 DIAGNOSIS — Z13.228 ENCOUNTER FOR SCREENING FOR OTHER METABOLIC DISORDERS: ICD-10-CM

## 2023-05-31 DIAGNOSIS — E78.5 HYPERLIPIDEMIA, UNSPECIFIED: ICD-10-CM

## 2023-05-31 DIAGNOSIS — I77.810 THORACIC AORTIC ECTASIA: ICD-10-CM

## 2023-05-31 DIAGNOSIS — J18.9 PNEUMONIA, UNSPECIFIED ORGANISM: ICD-10-CM

## 2023-05-31 DIAGNOSIS — R06.09 OTHER FORMS OF DYSPNEA: ICD-10-CM

## 2023-05-31 DIAGNOSIS — I25.10 ATHEROSCLEROTIC HEART DISEASE OF NATIVE CORONARY ARTERY W/OUT ANGINA PECTORIS: ICD-10-CM

## 2023-05-31 DIAGNOSIS — I10 ESSENTIAL (PRIMARY) HYPERTENSION: ICD-10-CM

## 2023-05-31 PROCEDURE — 71046 X-RAY EXAM CHEST 2 VIEWS: CPT

## 2023-05-31 PROCEDURE — 93000 ELECTROCARDIOGRAM COMPLETE: CPT

## 2023-05-31 PROCEDURE — 99214 OFFICE O/P EST MOD 30 MIN: CPT | Mod: 25

## 2023-05-31 NOTE — HISTORY OF PRESENT ILLNESS
[FreeTextEntry1] : This is a 58 y/o female with a pmhx of HTN, HLD, Dilated Aortic root, CAD, Sarcoidosis, who presents today for routine follow up. pt recently started on clarithromycin for pneumonia by her pulm Dr Fuller.  pt continues to have dry cough reports getting better. uses nebs PRN. Denies any SOB reports mild SAUCEDO when walking up the stairs. report some chest heaviness. had 99.9 feve flast night.- to see pulm in a few months

## 2023-06-01 LAB
25(OH)D3 SERPL-MCNC: 27.1 NG/ML
ALBUMIN SERPL ELPH-MCNC: 4.4 G/DL
ALP BLD-CCNC: 95 U/L
ALT SERPL-CCNC: 10 U/L
ANION GAP SERPL CALC-SCNC: 12 MMOL/L
APPEARANCE: CLEAR
AST SERPL-CCNC: 19 U/L
BACTERIA: NEGATIVE /HPF
BASOPHILS # BLD AUTO: 0.04 K/UL
BASOPHILS NFR BLD AUTO: 0.6 %
BILIRUB SERPL-MCNC: 0.4 MG/DL
BILIRUBIN URINE: NEGATIVE
BLOOD URINE: NEGATIVE
BUN SERPL-MCNC: 12 MG/DL
CALCIUM SERPL-MCNC: 10.1 MG/DL
CAST: 0 /LPF
CHLORIDE SERPL-SCNC: 104 MMOL/L
CHOLEST SERPL-MCNC: 246 MG/DL
CK SERPL-CCNC: 67 U/L
CO2 SERPL-SCNC: 25 MMOL/L
COLOR: YELLOW
CREAT SERPL-MCNC: 0.73 MG/DL
EGFR: 95 ML/MIN/1.73M2
EOSINOPHIL # BLD AUTO: 0.13 K/UL
EOSINOPHIL NFR BLD AUTO: 1.9 %
EPITHELIAL CELLS: 0 /HPF
ESTIMATED AVERAGE GLUCOSE: 137 MG/DL
FERRITIN SERPL-MCNC: 22 NG/ML
FOLATE SERPL-MCNC: 18.2 NG/ML
GLUCOSE QUALITATIVE U: NEGATIVE MG/DL
GLUCOSE SERPL-MCNC: 100 MG/DL
HBA1C MFR BLD HPLC: 6.4 %
HCT VFR BLD CALC: 42.9 %
HDLC SERPL-MCNC: 74 MG/DL
HGB BLD-MCNC: 13.6 G/DL
IMM GRANULOCYTES NFR BLD AUTO: 0.1 %
IRON SATN MFR SERPL: 11 %
IRON SERPL-MCNC: 45 UG/DL
KETONES URINE: NEGATIVE MG/DL
LDLC SERPL CALC-MCNC: 151 MG/DL
LEUKOCYTE ESTERASE URINE: NEGATIVE
LYMPHOCYTES # BLD AUTO: 2.19 K/UL
LYMPHOCYTES NFR BLD AUTO: 32.2 %
MAN DIFF?: NORMAL
MCHC RBC-ENTMCNC: 29.4 PG
MCHC RBC-ENTMCNC: 31.7 GM/DL
MCV RBC AUTO: 92.7 FL
MICROSCOPIC-UA: NORMAL
MONOCYTES # BLD AUTO: 0.49 K/UL
MONOCYTES NFR BLD AUTO: 7.2 %
NEUTROPHILS # BLD AUTO: 3.95 K/UL
NEUTROPHILS NFR BLD AUTO: 58 %
NITRITE URINE: NEGATIVE
NONHDLC SERPL-MCNC: 172 MG/DL
PH URINE: 6
PLATELET # BLD AUTO: 214 K/UL
POTASSIUM SERPL-SCNC: 4.2 MMOL/L
PROT SERPL-MCNC: 6.9 G/DL
PROTEIN URINE: NEGATIVE MG/DL
RBC # BLD: 4.63 M/UL
RBC # FLD: 14 %
RED BLOOD CELLS URINE: 1 /HPF
SODIUM SERPL-SCNC: 141 MMOL/L
SPECIFIC GRAVITY URINE: 1.01
T4 FREE SERPL-MCNC: 1.2 NG/DL
TIBC SERPL-MCNC: 413 UG/DL
TRANSFERRIN SERPL-MCNC: 352 MG/DL
TRIGL SERPL-MCNC: 110 MG/DL
TSH SERPL-ACNC: 1.1 UIU/ML
UIBC SERPL-MCNC: 368 UG/DL
UROBILINOGEN URINE: 0.2 MG/DL
VIT B12 SERPL-MCNC: 372 PG/ML
WBC # FLD AUTO: 6.81 K/UL
WHITE BLOOD CELLS URINE: 0 /HPF

## 2023-06-07 ENCOUNTER — APPOINTMENT (OUTPATIENT)
Dept: PULMONOLOGY | Facility: CLINIC | Age: 58
End: 2023-06-07
Payer: COMMERCIAL

## 2023-06-07 ENCOUNTER — NON-APPOINTMENT (OUTPATIENT)
Age: 58
End: 2023-06-07

## 2023-06-07 VITALS
DIASTOLIC BLOOD PRESSURE: 64 MMHG | RESPIRATION RATE: 16 BRPM | SYSTOLIC BLOOD PRESSURE: 122 MMHG | HEART RATE: 65 BPM | HEIGHT: 65 IN | OXYGEN SATURATION: 96 % | BODY MASS INDEX: 25.83 KG/M2 | WEIGHT: 155 LBS | TEMPERATURE: 97.2 F

## 2023-06-07 DIAGNOSIS — R05.9 COUGH, UNSPECIFIED: ICD-10-CM

## 2023-06-07 PROCEDURE — 94010 BREATHING CAPACITY TEST: CPT

## 2023-06-07 PROCEDURE — 99214 OFFICE O/P EST MOD 30 MIN: CPT | Mod: 25

## 2023-06-07 RX ORDER — CLARITHROMYCIN 500 MG/1
500 TABLET, FILM COATED ORAL
Qty: 20 | Refills: 0 | Status: DISCONTINUED | COMMUNITY
Start: 2023-05-05 | End: 2023-06-07

## 2023-06-07 RX ORDER — OLOPATADINE HYDROCHLORIDE 2 MG/ML
0.2 SOLUTION OPHTHALMIC
Qty: 2.5 | Refills: 5 | Status: DISCONTINUED | COMMUNITY
Start: 2022-05-17 | End: 2023-06-07

## 2023-06-07 RX ORDER — OLOPATADINE HYDROCHLORIDE 2 MG/ML
0.2 SOLUTION OPHTHALMIC
Qty: 2.5 | Refills: 1 | Status: DISCONTINUED | COMMUNITY
Start: 2023-04-28 | End: 2023-06-07

## 2023-06-07 RX ORDER — LEVOCETIRIZINE DIHYDROCHLORIDE 5 MG/1
5 TABLET ORAL
Qty: 1 | Refills: 1 | Status: DISCONTINUED | COMMUNITY
Start: 2022-05-17 | End: 2023-06-07

## 2023-06-07 RX ORDER — DOXYCYCLINE HYCLATE 100 MG/1
100 TABLET ORAL DAILY
Qty: 20 | Refills: 0 | Status: DISCONTINUED | COMMUNITY
Start: 2022-11-17 | End: 2023-06-07

## 2023-06-07 RX ORDER — DESLORATADINE 5 MG/1
5 TABLET ORAL DAILY
Qty: 90 | Refills: 1 | Status: DISCONTINUED | COMMUNITY
Start: 2022-05-17 | End: 2023-06-07

## 2023-06-07 RX ORDER — BUDESONIDE AND FORMOTEROL FUMARATE DIHYDRATE 160; 4.5 UG/1; UG/1
160-4.5 AEROSOL RESPIRATORY (INHALATION) TWICE DAILY
Qty: 1 | Refills: 5 | Status: DISCONTINUED | COMMUNITY
Start: 2022-11-17 | End: 2023-06-07

## 2023-06-07 RX ORDER — AZELASTINE HYDROCHLORIDE 137 UG/1
0.1 SPRAY, METERED NASAL TWICE DAILY
Qty: 3 | Refills: 1 | Status: DISCONTINUED | COMMUNITY
Start: 2023-04-28 | End: 2023-06-07

## 2023-06-09 NOTE — HISTORY OF PRESENT ILLNESS
[TextBox_4] : Ms. CABALLERO is a 57 year old female with a history of CAD, HLD, HTN, chickenpox as a kid, ABNORMAL CT, remote sarcoidosis in 1990s treated w/ steroids, who presents to the office for an acute visit.\par \par Her chief concern is cough. \par \par Patient reports she completed Biaxin that was prescribed on May 5.  She reports cough has improved 60%.  Patient states she is not taking her maintenance inhalers.  She reports she had a chest x-ray with her primary care doctor which was normal.  Patient states she takes Symbicort inhaler once a day as needed.  She reports the cough is not disturbing her sleep.\par Patient denies recent travel or sick contact.\par \par Patient denies fever, chills, SOB @ rest or exertion, wheezing, nasal congestion, runny nose, PND, or heartburn/reflux.\par

## 2023-06-09 NOTE — PROCEDURE
[FreeTextEntry1] : SPI performed in office show mild restriction. \par FEV: 81\par FEV1/FVC Ratio: 107\par AMB31-25%: 102\par \par

## 2023-06-09 NOTE — REASON FOR VISIT
[Follow-Up] : a follow-up visit [Cough] : cough [Shortness of Breath] : shortness of breath [TextBox_44] : RADs

## 2023-06-09 NOTE — REVIEW OF SYSTEMS
[Cough] : cough [Negative] : Psychiatric [Hemoptysis] : no hemoptysis [Chest Tightness] : no chest tightness [Frequent URIs] : no frequent URIs [Sputum] : no sputum [Dyspnea] : no dyspnea [Pleuritic Pain] : no pleuritic pain [Wheezing] : no wheezing [A.M. Dry Mouth] : no a.m. dry mouth [SOB on Exertion] : no sob on exertion

## 2023-06-09 NOTE — ASSESSMENT
[FreeTextEntry1] : Ms. CABALLERO is a 57 year old female with a history of CAD, HLD, HTN, chickenpox as a kid, ABNORMAL CT, remote sarcoidosis in 1990s treated w/ steroids, who presents to the office for an acute visit.\par \par 1. Dry Cough\par -likely r/t noncompliance with asthma inhalers\par -Advised to restart on asthma inhaler\par \par 2. RADS\par -restart Symbicort 160 2 inhalations BID: rinse & gargle after use \par -continue Albuterol (.83) via nebulizer, up to Q6H\par -continue Pulmicort 0.5% via nebulizer BID: rinse & gargle after use\par \par 3. Allergies. sinus \par -continue Clarinex 5 mg QAM \par -continue Xyzal 5 mg QHS \par \par Patient to follow up with Dr. Fuller as scheduled.\par Patient to call with further questions and concerns.\par Patient verbalizes understanding of care plan and is agreeable.\par

## 2023-06-14 ENCOUNTER — APPOINTMENT (OUTPATIENT)
Dept: CARDIOLOGY | Facility: CLINIC | Age: 58
End: 2023-06-14

## 2023-07-27 ENCOUNTER — RX RENEWAL (OUTPATIENT)
Age: 58
End: 2023-07-27

## 2023-08-14 ENCOUNTER — APPOINTMENT (OUTPATIENT)
Dept: PULMONOLOGY | Facility: CLINIC | Age: 58
End: 2023-08-14

## 2023-08-14 NOTE — REASON FOR VISIT
[Follow-Up] : a follow-up visit [TextBox_44] : abnormal CT of chest ?scarring ?EDS, +OSAS - mild allergy Sx

## 2023-08-14 NOTE — ADDENDUM
[FreeTextEntry1] : Documented by Aurora Restrepo acting as a scribe for Dr. Shiva Fuller on 08/14/2023. All medical record entries made by the Scribe were at my, Dr. Shiva Fuller's, direction and personally dictated by me on 08/14/2023. I have reviewed the chart and agree that the record accurately reflects my personal performance of the history, physical exam, assessment and plan. I have also personally directed, reviewed, and agree with the discharge instructions.

## 2023-08-14 NOTE — ASSESSMENT
[FreeTextEntry1] : Ms. CABALLERO  is a 58 year old female with a history of CAD, HLD, HTN, chickenpox as a kid, ABNORMAL CT,   remote sarcoidosis in 1990s treated w/ steroids, who now comes to the office for a f/p pulmonary evaluation asymptomatic abnormal CT of chest ?scarring ?EDS, +OSAS - mild allergy Sx - now ramification of PNA/bronchitis; ?BOOP/  Her shortness of breath is multifactorial due to: -poor mechanics of breathing  -out of shape  - over weight  - pulmonary disease   - abnormal ct    - ?post sarcoidosis    -?/BOOP -cardiac disease   problem 1: abnormal CT c/w inflammatory dz c/w changes  ddx:  ?sarcoidosis, ?post chicken pox,  ?hypersensitivity pneumonitis , ?less likely CA, ?old TB , ?MINISTERIO , ?intraparenchymal lymph nodes  - no indication that supports malignancy  - complete  CT scan in sep/2021- [place], CT next 4/2024 - s/p blood work: ESR, ACE level, hypersensitivity panel, Quantiferon Gold- WNL  CAT scans are the only radiological modality to identify abnormalities w/in the lungs with regards to nodules/masses/lymph nodes. Risks, benefits were reviewed in detail. The guidelines for abnormalities include follow up CT scans at various intervals which could range from 6 weeks to 1 year intervals. If there is a change for the worse then consideration for a biopsy will be considered if you are a candidate. Second opinion evaluation with a thoracic surgeon or an interventional radiologist could be offered.   Problem 1A: PNA/RADS -Add Symbicort 160 2 inhalations BID  -add Albuterol (.83) via nebulizer, up to Q6H -add Pulmicort 0.5% via nebulizer BID -add Biaxin 500 mg BID -f/u CT 4/2023 improved/ next 4/2024 -Chest X-Ray/CT revealed an infiltrate c/w pneumonia; this based on the patient's clinical scenario required additional therapy. Any change in patient's status will require hospitalization at the nearest hospital and a local ambulance will need to be called. Our group is on staff at [facility] and [facility] as consultants. The patient/family is instructed to notify our office with any change in condition. -Asthma is believed to be caused by inherited (genetic) and environmental factor, but its exact cause is unknown. Asthma may be triggered by allergens, lung infections, or irritants in the air. Asthma triggers are different for each person.   Problem 2 Allergies. sinus  - Add Olopatadine 0.6% 1 sniff each nostril twice a day/ 0.2 ocular BID -add Clarinex 5 mg QAM  -add Xyzal 5 mg QHS  - Environmental measures for allergies were encouraged including mattress and pillow cover, air purifier, and environmental controls.   problem 4 Sarcoidosis - in remission  - full PFTs twice yearly  -Sarcoidosis is a medical mystery and can present with very serious illness or little consequence. The disease can affect any organ including skin, liver, lymph glands, spleen, eyes, nervous system, musculoskeletal system, heart, brain, kidneys, and including the lungs. Pulmonary sarcoidosis can cause loss of lung volume and abnormal lung stiffness. This disease is characterized by presence of granulomas, small areas of inflamed cells. Sarcoidosis patients should have regular cardiac and eye examinations as well as regular PFTs.   Problem 5: + OSAS- Mild (risk factors: snoring, fatigue)- 5/2022  -recommended "Excite"  - s/p HSS - DD pending Sleep apnea is associated with adverse clinical consequences which can affect most organ systems.  Cardiovascular disease risk includes arrhythmias, atrial fibrillation, hypertension, coronary artery disease, and stroke. Metabolic disorders include diabetes type 2, non-alcoholic fatty liver disease. Mood disorder especially depression; and cognitive decline especially in the elderly. Associations with  chronic reflux/Drew's esophagus some but not all inclusive.  -Reasons  include arousal consistent with hypopnea; respiratory events most prominent in REM sleep or supine position; therefore sleep staging and body position are important for accurate diagnosis and estimation of AHI.    problem 3: cardiac disease -recommended to continue to follow up with Cardiologist if needed. (Keshia)   problem 4 : health maintenance  -recommended Wim Hof and Buteyko breathing techniques  -recommended internet exercise platforms: [employer] and Aerobic exercise for seniors -s/p Covid 19 vaccine x3  -recommended yearly flu shot after October 15 2022 -recommended strep pneumonia vaccines: Prevnar-13 vaccine, followed by Pneumo vaccine 23 one year following after 65 years old.  -recommended early intervention for Upper Respiratory Infections (URIs) -recommended regular osteoporosis evaluations -recommended early dermatological evaluations -recommended after the age of 50 to the age of 70, colonoscopy every 5 years  F/U in 3-4 months She is encouraged to call with any changes, concerns, or questions

## 2023-08-14 NOTE — HISTORY OF PRESENT ILLNESS
[TextBox_4] : Ms. CABALLERO is a 58 year old female presenting to the office today via video call for follow up pulmonary evaluation. Her chief complaint is   -she denies any headaches, nausea, emesis, fever, chills, sweats, chest pain, chest pressure, coughing, wheezing, palpitations, diarrhea, constipation, dysphagia, vertigo, arthralgias, myalgias, leg swelling, itchy eyes, itchy ears, heartburn, reflux, or sour taste in the mouth.

## 2023-09-03 ENCOUNTER — RX RENEWAL (OUTPATIENT)
Age: 58
End: 2023-09-03

## 2023-09-06 ENCOUNTER — RX RENEWAL (OUTPATIENT)
Age: 58
End: 2023-09-06

## 2023-09-06 RX ORDER — ALBUTEROL SULFATE 90 UG/1
108 (90 BASE) INHALANT RESPIRATORY (INHALATION)
Qty: 6.7 | Refills: 1 | Status: ACTIVE | COMMUNITY
Start: 2023-06-07 | End: 1900-01-01

## 2023-09-07 ENCOUNTER — APPOINTMENT (OUTPATIENT)
Dept: PULMONOLOGY | Facility: CLINIC | Age: 58
End: 2023-09-07
Payer: COMMERCIAL

## 2023-09-07 VITALS
OXYGEN SATURATION: 96 % | SYSTOLIC BLOOD PRESSURE: 130 MMHG | HEIGHT: 65 IN | DIASTOLIC BLOOD PRESSURE: 60 MMHG | TEMPERATURE: 97.3 F | RESPIRATION RATE: 16 BRPM | BODY MASS INDEX: 25.83 KG/M2 | WEIGHT: 155 LBS | HEART RATE: 73 BPM

## 2023-09-07 DIAGNOSIS — R06.83 SNORING: ICD-10-CM

## 2023-09-07 DIAGNOSIS — J30.2 OTHER SEASONAL ALLERGIC RHINITIS: ICD-10-CM

## 2023-09-07 PROCEDURE — 95012 NITRIC OXIDE EXP GAS DETER: CPT

## 2023-09-07 PROCEDURE — 94010 BREATHING CAPACITY TEST: CPT

## 2023-09-07 PROCEDURE — 99214 OFFICE O/P EST MOD 30 MIN: CPT | Mod: 25

## 2023-09-07 RX ORDER — TIOTROPIUM BROMIDE INHALATION SPRAY 3.12 UG/1
2.5 SPRAY, METERED RESPIRATORY (INHALATION) DAILY
Qty: 3 | Refills: 1 | Status: ACTIVE | COMMUNITY
Start: 2023-09-07 | End: 1900-01-01

## 2023-09-07 RX ORDER — OLOPATADINE HYDROCHLORIDE 2 MG/ML
0.2 SOLUTION OPHTHALMIC
Qty: 3 | Refills: 1 | Status: ACTIVE | COMMUNITY
Start: 2023-09-07 | End: 1900-01-01

## 2023-09-07 NOTE — ASSESSMENT
[FreeTextEntry1] : Ms. CABALLERO  is a 58  year old female with a history of CAD, HLD, HTN, chickenpox as a kid, ABNORMAL CT,   remote sarcoidosis in 1990s treated w/ steroids, who now comes to the office via video call for a f/u pulmonary evaluation asymptomatic abnormal CT of chest ?scarring ?EDS, +OSAS - mild allergy Sx - now ramification of PNA/bronchitis; ?BOOP/-asthma/allergy- untreated PHILIP  Her shortness of breath is multifactorial due to: -poor mechanics of breathing  -out of shape  - over weight  - pulmonary disease   - abnormal ct    - ?post sarcoidosis    -?/BOOP -cardiac disease   problem 1: abnormal CT c/w inflammatory dz c/w changes  ddx:  ?sarcoidosis, ?post chicken pox,  ?hypersensitivity pneumonitis , ?less likely CA, ?old TB , ?MINISTERIO , ?intraparenchymal lymph nodes  - no indication that supports malignancy  - complete  CT scan in sep/2021- [place], CT next 4/2024 - s/p blood work: ESR, ACE level, hypersensitivity panel, Quantiferon Gold- WNL  CAT scans are the only radiological modality to identify abnormalities w/in the lungs with regards to nodules/masses/lymph nodes. Risks, benefits were reviewed in detail. The guidelines for abnormalities include follow up CT scans at various intervals which could range from 6 weeks to 1 year intervals. If there is a change for the worse then consideration for a biopsy will be considered if you are a candidate. Second opinion evaluation with a thoracic surgeon or an interventional radiologist could be offered.   Problem 1A: PNA/RADS  -add Spiriva at 2 inhalations QAM -PRN Symbicort 160 2 inhalations BID  -PRN Albuterol (.83) via nebulizer, up to Q6H -add Pulmicort 0.5% via nebulizer BID -add Ventolin 2 puffs Q6H, pre-exercise -f/u CT 4/2023 improved/ next 4/2024 -Chest X-Ray/CT revealed an infiltrate c/w pneumonia; this based on the patient's clinical scenario required additional therapy. Any change in patient's status will require hospitalization at the nearest hospital and a local ambulance will need to be called. Our group is on staff at [facility] and [facility] as consultants. The patient/family is instructed to notify our office with any change in condition. -Asthma is believed to be caused by inherited (genetic) and environmental factor, but its exact cause is unknown. Asthma may be triggered by allergens, lung infections, or irritants in the air. Asthma triggers are different for each person.   Problem 2 Allergies. sinus  -add Olopatadine 0.6% 1 sniff BID - Add Olopatadine 0.6% 1 sniff each nostril twice a day/ 0.2 ocular BID -add Clarinex 5 mg QAM  -add Xyzal 5 mg QHS  - Environmental measures for allergies were encouraged including mattress and pillow cover, air purifier, and environmental controls.   problem 4 Sarcoidosis - in remission  - full PFTs twice yearly  -Sarcoidosis is a medical mystery and can present with very serious illness or little consequence. The disease can affect any organ including skin, liver, lymph glands, spleen, eyes, nervous system, musculoskeletal system, heart, brain, kidneys, and including the lungs. Pulmonary sarcoidosis can cause loss of lung volume and abnormal lung stiffness. This disease is characterized by presence of granulomas, small areas of inflamed cells. Sarcoidosis patients should have regular cardiac and eye examinations as well as regular PFTs.   Problem 5: + OSAS- Mild (risk factors: snoring, fatigue)- 5/2022 - [place]  -recommended "Excite"  - s/p HSS - DD pending (mariano Sleep apnea is associated with adverse clinical consequences which can affect most organ systems.  Cardiovascular disease risk includes arrhythmias, atrial fibrillation, hypertension, coronary artery disease, and stroke. Metabolic disorders include diabetes type 2, non-alcoholic fatty liver disease. Mood disorder especially depression; and cognitive decline especially in the elderly. Associations with  chronic reflux/Drew's esophagus some but not all inclusive.  -Reasons  include arousal consistent with hypopnea; respiratory events most prominent in REM sleep or supine position; therefore sleep staging and body position are important for accurate diagnosis and estimation of AHI.    problem 3: cardiac disease -recommended to continue to follow up with Cardiologist if needed. (Keshia)   problem 4 : health maintenance  - work accommodation form -recommended Wim Hof and Butclementeko breathing techniques  -recommended internet exercise platforms: [email or web address] and Aerobic exercise for seniors -s/p Covid 19 vaccine x3  -recommended yearly flu shot after October 15 2022 -recommended strep pneumonia vaccines: Prevnar-13 vaccine, followed by Pneumo vaccine 23 one year following after 65 years old.  -recommended early intervention for Upper Respiratory Infections (URIs) -recommended regular osteoporosis evaluations -recommended early dermatological evaluations -recommended after the age of 50 to the age of 70, colonoscopy every 5 years  F/U in 3-4 months She is encouraged to call with any changes, concerns, or questions

## 2023-09-07 NOTE — ADDENDUM
[FreeTextEntry1] : Documented by Hipolito Bee acting as a scribe for Dr. Shiva Fuller on 09/07/2023. All medical record entries made by the Scribe were at my, Dr. Shiva Fuller's, direction and personally dictated by me on 09/07/2023. I have reviewed the chart and agree that the record accurately reflects my personal performance of the history, physical exam, assessment and plan. I have also personally directed, reviewed, and agree with the discharge instructions.

## 2023-09-07 NOTE — REASON FOR VISIT
[Follow-Up] : a follow-up visit [TextBox_44] : via video call- abnormal CT of chest ?scarring ?EDS, +OSAS - mild allergy Sx

## 2023-09-07 NOTE — PROCEDURE
[FreeTextEntry1] : FENO was 23; a normal value being less than 25 Fractional exhaled nitric oxide (FENO) is regarded as a simple, noninvasive method for assessing eosinophilic airway inflammation. Produced by a variety of cells within the lung, nitric oxide (NO) concentrations are generally low in healthy individuals. However, high concentrations of NO appear to be involved in nonspecific host defense mechanisms and chronic inflammatory diseases such as asthma. The American Thoracic Society (ATS) therefore has recommended using FENO to aid in the diagnosis and monitoring of eosinophilic airway inflammation and asthma, and for identifying steroid responsive individuals whose chronic respiratory symptoms may be caused by airway inflammation.

## 2023-09-07 NOTE — HISTORY OF PRESENT ILLNESS
[TextBox_4] : Ms. CABALLERO is a 58 year old female presenting to the office today via video call for follow up pulmonary evaluation. Her chief complaint is  - she notes SOB - she notes coughing  -she notes vision is stable -she notes bowels are regular - she notes having to walk long distances for her commute to work  -she notes allergies  -she notes GERD Sx -she notes dysphonia -she notes coughing to clear  -she notes globus pharyngeus - she notes all of her new Sx are onset since PNA infection  -she denies taking any new medications, vitamins, or supplements - she notes snoring  - she notes needing to get DDx for sleep apnea   -she denies any headaches, nausea, emesis, fever, chills, sweats, chest pain, chest pressure, coughing, wheezing, palpitations, diarrhea, constipation, dysphagia, vertigo, arthralgias, myalgias, leg swelling, itchy eyes, itchy ears, or sour taste in the mouth.

## 2023-11-30 ENCOUNTER — RX RENEWAL (OUTPATIENT)
Age: 58
End: 2023-11-30

## 2024-02-19 ENCOUNTER — RX RENEWAL (OUTPATIENT)
Age: 59
End: 2024-02-19

## 2024-03-23 LAB
25(OH)D3 SERPL-MCNC: 29.9 NG/ML
ALBUMIN SERPL ELPH-MCNC: 4.4 G/DL
ALP BLD-CCNC: 86 U/L
ALT SERPL-CCNC: 16 U/L
ANION GAP SERPL CALC-SCNC: 11 MMOL/L
APPEARANCE: CLEAR
AST SERPL-CCNC: 27 U/L
BACTERIA: NEGATIVE
BILIRUB DIRECT SERPL-MCNC: 0.1 MG/DL
BILIRUB INDIRECT SERPL-MCNC: 0.3 MG/DL
BILIRUB SERPL-MCNC: 0.4 MG/DL
BILIRUBIN URINE: NEGATIVE
BLOOD URINE: NEGATIVE
BUN SERPL-MCNC: 14 MG/DL
CALCIUM SERPL-MCNC: 9.8 MG/DL
CHLORIDE SERPL-SCNC: 104 MMOL/L
CHOLEST SERPL-MCNC: 187 MG/DL
CK SERPL-CCNC: 62 U/L
CO2 SERPL-SCNC: 25 MMOL/L
COLOR: COLORLESS
CREAT SERPL-MCNC: 0.83 MG/DL
EGFR: 81 ML/MIN/1.73M2
ESTIMATED AVERAGE GLUCOSE: 126 MG/DL
GLUCOSE QUALITATIVE U: NEGATIVE
GLUCOSE SERPL-MCNC: 94 MG/DL
HBA1C MFR BLD HPLC: 6 %
HDLC SERPL-MCNC: 72 MG/DL
HYALINE CASTS: 0 /LPF
KETONES URINE: NEGATIVE
LDLC SERPL CALC-MCNC: 95 MG/DL
LEUKOCYTE ESTERASE URINE: NEGATIVE
MICROSCOPIC-UA: NORMAL
NITRITE URINE: NEGATIVE
NONHDLC SERPL-MCNC: 115 MG/DL
PH URINE: 6
POTASSIUM SERPL-SCNC: 3.9 MMOL/L
PROT SERPL-MCNC: 7.2 G/DL
PROTEIN URINE: NEGATIVE
RED BLOOD CELLS URINE: 0 /HPF
SODIUM SERPL-SCNC: 140 MMOL/L
SPECIFIC GRAVITY URINE: 1.01
SQUAMOUS EPITHELIAL CELLS: 0 /HPF
T4 FREE SERPL-MCNC: 1.3 NG/DL
TRIGL SERPL-MCNC: 100 MG/DL
TSH SERPL-ACNC: 1.15 UIU/ML
UROBILINOGEN URINE: NORMAL
WHITE BLOOD CELLS URINE: 1 /HPF

## 2024-05-18 ENCOUNTER — RX RENEWAL (OUTPATIENT)
Age: 59
End: 2024-05-18

## 2024-05-18 RX ORDER — ATORVASTATIN CALCIUM 40 MG/1
40 TABLET, FILM COATED ORAL
Qty: 90 | Refills: 1 | Status: ACTIVE | COMMUNITY
Start: 2021-04-08 | End: 1900-01-01

## 2024-05-30 ENCOUNTER — APPOINTMENT (OUTPATIENT)
Dept: PULMONOLOGY | Facility: CLINIC | Age: 59
End: 2024-05-30
Payer: COMMERCIAL

## 2024-05-30 VITALS
DIASTOLIC BLOOD PRESSURE: 71 MMHG | TEMPERATURE: 97.2 F | WEIGHT: 155 LBS | HEIGHT: 66 IN | RESPIRATION RATE: 18 BRPM | SYSTOLIC BLOOD PRESSURE: 140 MMHG | OXYGEN SATURATION: 96 % | HEART RATE: 81 BPM | BODY MASS INDEX: 24.91 KG/M2

## 2024-05-30 DIAGNOSIS — J45.909 UNSPECIFIED ASTHMA, UNCOMPLICATED: ICD-10-CM

## 2024-05-30 DIAGNOSIS — J30.9 ALLERGIC RHINITIS, UNSPECIFIED: ICD-10-CM

## 2024-05-30 DIAGNOSIS — R91.8 OTHER NONSPECIFIC ABNORMAL FINDING OF LUNG FIELD: ICD-10-CM

## 2024-05-30 DIAGNOSIS — D86.0 SARCOIDOSIS OF LUNG: ICD-10-CM

## 2024-05-30 DIAGNOSIS — E55.9 VITAMIN D DEFICIENCY, UNSPECIFIED: ICD-10-CM

## 2024-05-30 DIAGNOSIS — R06.02 SHORTNESS OF BREATH: ICD-10-CM

## 2024-05-30 DIAGNOSIS — G47.33 OBSTRUCTIVE SLEEP APNEA (ADULT) (PEDIATRIC): ICD-10-CM

## 2024-05-30 DIAGNOSIS — R93.89 ABNORMAL FINDINGS ON DIAGNOSTIC IMAGING OF OTHER SPECIFIED BODY STRUCTURES: ICD-10-CM

## 2024-05-30 PROCEDURE — G2211 COMPLEX E/M VISIT ADD ON: CPT | Mod: NC

## 2024-05-30 PROCEDURE — 94010 BREATHING CAPACITY TEST: CPT

## 2024-05-30 PROCEDURE — 99214 OFFICE O/P EST MOD 30 MIN: CPT | Mod: 25

## 2024-05-30 RX ORDER — BUDESONIDE, GLYCOPYRROLATE, AND FORMOTEROL FUMARATE 160; 9; 4.8 UG/1; UG/1; UG/1
160-9-4.8 AEROSOL, METERED RESPIRATORY (INHALATION)
Qty: 3 | Refills: 1 | Status: ACTIVE | COMMUNITY
Start: 2024-05-30 | End: 1900-01-01

## 2024-05-30 RX ORDER — DESLORATADINE 5 MG/1
5 TABLET ORAL
Qty: 90 | Refills: 1 | Status: ACTIVE | COMMUNITY
Start: 2024-05-30 | End: 1900-01-01

## 2024-05-30 NOTE — HISTORY OF PRESENT ILLNESS
[TextBox_4] : Ms. CABALLERO is a 58 year old female presenting to the office today for follow up pulmonary evaluation. Her chief complaint is  -she notes feeling generally well -she notes SOB  -she notes SAUCEDO with inclines  -she notes frequent cough  -she notes 'dealing with it" when it comes to her SOB  -she notes chest pressure  -she notes wheezing  -she notes dysphonia -she notes good quality of sleep -she notes sleeping for 7-8 hours -she notes exercising (walking) -she notes weight is stable -she notes diet is good -she notes appetite is stable -she denies taking any new medications, vitamins, or supplements -she notes stopping her cholesterol medications due to aches and pains  -she notes planning on moving -she notes needing an accommodation for work due to her commute  -she notes SOB and SAUCEDO on her commute to work   -she denies any headaches, nausea, emesis, fever, chills, sweats, chest pain, wheezing, palpitations, diarrhea, constipation, dysphagia, vertigo, leg swelling, itchy eyes, itchy ears, heartburn, reflux, or sour taste in the mouth.

## 2024-05-30 NOTE — ASSESSMENT
[FreeTextEntry1] : Ms. CABALLERO  is a 58 year old female with a history of CAD, HLD, HTN, chickenpox as a kid, ABNORMAL CT,   remote sarcoidosis in 1990s treated w/ steroids, who now comes to the office for a f/u pulmonary evaluation asymptomatic abnormal CT of chest ?scarring ?EDS, +OSAS - mild allergy Sx - s/p ramification of PNA/bronchitis; ?BOOP/-asthma/allergy- untreated PHILIP- remains SAUCEDO   Her shortness of breath is multifactorial due to: -poor mechanics of breathing  -out of shape  - over weight  - pulmonary disease   - abnormal ct    - ?post sarcoidosis    -?/BOOP -cardiac disease   problem 1: abnormal CT c/w inflammatory dz c/w changes  ddx:  ?sarcoidosis, ?post chicken pox,  ?hypersensitivity pneumonitis , ?less likely CA, ?old TB , ?MINISTERIO , ?intraparenchymal lymph nodes  - no indication that supports malignancy  - complete  CT scan in sep/2021- [place], CT next 4/2024 (now) - s/p blood work: ESR, ACE level, hypersensitivity panel, Quantiferon Gold- WNL  CAT scans are the only radiological modality to identify abnormalities w/in the lungs with regards to nodules/masses/lymph nodes. Risks, benefits were reviewed in detail. The guidelines for abnormalities include follow up CT scans at various intervals which could range from 6 weeks to 1 year intervals. If there is a change for the worse then consideration for a biopsy will be considered if you are a candidate. Second opinion evaluation with a thoracic surgeon or an interventional radiologist could be offered.   Problem 1A: PNA/RADS  -add Breztri 2 puffs BID  -PRN Albuterol (.83) via nebulizer, up to Q6H -add Pulmicort 0.5% via nebulizer BID -add Ventolin 2 puffs Q6H, pre-exercise -f/u CT 4/2023 improved/ next 4/2024 ([place]) rescripted 5/2024 -Chest X-Ray/CT revealed an infiltrate c/w pneumonia; this based on the patient's clinical scenario required additional therapy. Any change in patient's status will require hospitalization at the nearest hospital and a local ambulance will need to be called. Our group is on staff at [facility] and [facility] as consultants. The patient/family is instructed to notify our office with any change in condition. -Asthma is believed to be caused by inherited (genetic) and environmental factor, but its exact cause is unknown. Asthma may be triggered by allergens, lung infections, or irritants in the air. Asthma triggers are different for each person.   Problem 2 Allergies. sinus  -s/p Olopatadine 0.6% 1 sniff BID - s/p Olopatadine 0.6% 1 sniff each nostril twice a day/ 0.2 ocular BID -s/p Clarinex 5 mg QAM  -s/p  Xyzal 5 mg QHS  - Environmental measures for allergies were encouraged including mattress and pillow cover, air purifier, and environmental controls.   problem 4 Sarcoidosis - in remission  - full PFTs twice yearly  -Sarcoidosis is a medical mystery and can present with very serious illness or little consequence. The disease can affect any organ including skin, liver, lymph glands, spleen, eyes, nervous system, musculoskeletal system, heart, brain, kidneys, and including the lungs. Pulmonary sarcoidosis can cause loss of lung volume and abnormal lung stiffness. This disease is characterized by presence of granulomas, small areas of inflamed cells. Sarcoidosis patients should have regular cardiac and eye examinations as well as regular PFTs.   Problem 5: + OSAS- Mild (risk factors: snoring, fatigue)- 5/2022 - [place]  -recommended "Excite"  - s/p HSS - DD pending (Danoff) Sleep apnea is associated with adverse clinical consequences which can affect most organ systems.  Cardiovascular disease risk includes arrhythmias, atrial fibrillation, hypertension, coronary artery disease, and stroke. Metabolic disorders include diabetes type 2, non-alcoholic fatty liver disease. Mood disorder especially depression; and cognitive decline especially in the elderly. Associations with  chronic reflux/Drew's esophagus some but not all inclusive.  -Reasons  include arousal consistent with hypopnea; respiratory events most prominent in REM sleep or supine position; therefore sleep staging and body position are important for accurate diagnosis and estimation of AHI.    problem 3: cardiac disease -recommended to continue to follow up with Cardiologist if needed. (Keshia)   problem 4 : health maintenance  -recommended Wim Hof and Buteyko breathing techniques  -recommended internet exercise platforms: [employer] and Aerobic exercise for seniors -s/p Covid 19 vaccine x3  -recommended yearly flu shot after October 15 2023 -recommended strep pneumonia vaccines: Prevnar-13 vaccine, followed by Pneumo vaccine 23 one year following after 65 years old.  -recommended early intervention for Upper Respiratory Infections (URIs) -recommended regular osteoporosis evaluations -recommended early dermatological evaluations -recommended after the age of 50 to the age of 70, colonoscopy every 5 years  F/U in 3-4 months She is encouraged to call with any changes, concerns, or questions

## 2024-05-30 NOTE — REASON FOR VISIT
[Follow-Up] : a follow-up visit [TextBox_44] : abnormal CT of chest ?scarring ?EDS, +OSAS - mild allergy Sx, sarcoidosis, BOOP/RADS

## 2024-05-30 NOTE — PROCEDURE
[FreeTextEntry1] : PFTs revealed moderate restrictive dysfunction and obstructive dysfunction; FEV1 was 1.99 L, which is 50.1% of predicted; faint inspiratory limb.  PFTs were performed to evaluate for SOB  Sleep study (12/2022) revealed sleep apnea with an AHI/THOMAS of12.2

## 2024-05-30 NOTE — PHYSICAL EXAM
[No Acute Distress] : no acute distress [Well Nourished] : well nourished [Normal Appearance] : normal appearance [Well Groomed] : well groomed [No Deformities] : no deformities [Well Developed] : well developed [II] : Mallampati Class: II [TextBox_68] : I:E ratio 1:3; mild expiratory wheeze

## 2024-05-30 NOTE — ADDENDUM
[FreeTextEntry1] : Documented by Hipolito Bee acting as a scribe for Dr. Shiva Fuller on 05/30/2024. All medical record entries made by the Scribe were at my, Dr. Shiva Fuller's, direction and personally dictated by me on 05/30/2024. I have reviewed the chart and agree that the record accurately reflects my personal performance of the history, physical exam, assessment and plan. I have also personally directed, reviewed, and agree with the discharge instructions.

## 2024-06-18 ENCOUNTER — RX RENEWAL (OUTPATIENT)
Age: 59
End: 2024-06-18

## 2024-06-18 RX ORDER — ATENOLOL 25 MG/1
25 TABLET ORAL
Qty: 90 | Refills: 1 | Status: ACTIVE | COMMUNITY
Start: 2021-12-13 | End: 1900-01-01

## 2024-06-19 ENCOUNTER — RX RENEWAL (OUTPATIENT)
Age: 59
End: 2024-06-19

## 2024-08-31 NOTE — REASON FOR VISIT
[Follow-Up] : a follow-up visit [TextBox_44] : via video call- abnormal CT of chest ?scarring ?EDS, +OSAS - mild allergy Sx DISPLAY PLAN FREE TEXT DISPLAY PLAN FREE TEXT

## 2024-09-16 ENCOUNTER — RX RENEWAL (OUTPATIENT)
Age: 59
End: 2024-09-16

## 2024-10-29 ENCOUNTER — APPOINTMENT (OUTPATIENT)
Dept: CARDIOLOGY | Facility: CLINIC | Age: 59
End: 2024-10-29

## 2024-11-20 ENCOUNTER — APPOINTMENT (OUTPATIENT)
Dept: PULMONOLOGY | Facility: CLINIC | Age: 59
End: 2024-11-20

## 2024-12-30 ENCOUNTER — APPOINTMENT (OUTPATIENT)
Dept: CARDIOLOGY | Facility: CLINIC | Age: 59
End: 2024-12-30

## 2025-01-31 NOTE — ASSESSMENT
[FreeTextEntry1] : Ms. CABALLERO  is a 55 year old female with a history of CAD, HLD, HTN, chickenpox as a kid, ABNORMAL CT,  \par   remote sarcoidosis in 1990s treated w/ steroids, who now comes to the office for an initial pulmonary evaluation asymptomatic abnormal CT of chest ?scarring ?EDS, ?OSAS \par \par Her shortness of breath is multifactorial due to:\par -poor mechanics of breathing \par -out of shape \par - over weight \par - pulmonary disease\par   - abnormal ct \par   - ?post sarcoidosis \par -cardiac disease \par \par problem 1: abnormal CT c/w inflammatory dz\par ddx:  ?sarcoidosis, ?post chicken pox,  ?hypersensitivity pneumonitis , ?less likely CA, ?old TB , ?MINISTERIO , ?intraparenchymal lymph nodes \par - no indication that supports malignancy \par - get CT scan in sep/2021\par - get blood work: ESR, ACE level, hypersensitivity panel, Quantiferon Gold \par CAT scans are the only radiological modality to identify abnormalities w/in the lungs with regards to nodules/masses/lymph nodes. Risks, benefits were reviewed in detail. The guidelines for abnormalities include follow up CT scans at various intervals which could range from 6 weeks to 1 year intervals. If there is a change for the worse then consideration for a biopsy will be considered if you are a candidate. Second opinion evaluation with a thoracic surgeon or an interventional radiologist could be offered. \par \par \par problem 2: Sarcoidosis - in remission \par - get full PFTs \par -Sarcoidosis is a medical mystery and can present with very serious illness or little consequence. The disease can affect any organ including skin, liver, lymph glands, spleen, eyes, nervous system, musculoskeletal system, heart, brain, kidneys, and including the lungs. Pulmonary sarcoidosis can cause loss of lung volume and abnormal lung stiffness. This disease is characterized by presence of granulomas, small areas of inflamed cells. Sarcoidosis patients should have regular cardiac and eye examinations as well as regular PFTs. \par \par \par Problem 3: ?OSAS (risk factors: snoring, fatigue)\par - get HSS\par Sleep apnea is associated with adverse clinical consequences which can affect most organ systems.  Cardiovascular disease risk includes arrhythmias, atrial fibrillation, hypertension, coronary artery disease, and stroke. Metabolic disorders include diabetes type 2, non-alcoholic fatty liver disease. Mood disorder especially depression; and cognitive decline especially in the elderly. Associations with  chronic reflux/Drew’s esophagus some but not all inclusive. \par -Reasons  include arousal consistent with hypopnea; respiratory events most prominent in REM sleep or supine position; therefore sleep staging and body position are important for accurate diagnosis and estimation of AHI. \par \par \par problem 3: cardiac disease\par -recommended to continue to follow up with Cardiologist if needed. \par \par \par problem 4 : health maintenance \par -recommended yearly flu shot after October 15\par -recommended strep pneumonia vaccines: Prevnar-13 vaccine, followed by Pneumo vaccine 23 one year following after 65 years old. \par -recommended early intervention for Upper Respiratory Infections (URIs)\par -recommended regular osteoporosis evaluations\par -recommended early dermatological evaluations\par -recommended after the age of 50 to the age of 70, colonoscopy every 5 years\par \par F/U in 6-8 weeks.\par She is encouraged to call with any changes, concerns, or questions\par \par 
Detail Level: Detailed

## 2025-02-18 ENCOUNTER — APPOINTMENT (OUTPATIENT)
Dept: PULMONOLOGY | Facility: CLINIC | Age: 60
End: 2025-02-18

## 2025-03-04 ENCOUNTER — APPOINTMENT (OUTPATIENT)
Dept: CARDIOLOGY | Facility: CLINIC | Age: 60
End: 2025-03-04

## 2025-03-12 ENCOUNTER — RX RENEWAL (OUTPATIENT)
Age: 60
End: 2025-03-12